# Patient Record
Sex: FEMALE | Race: WHITE
[De-identification: names, ages, dates, MRNs, and addresses within clinical notes are randomized per-mention and may not be internally consistent; named-entity substitution may affect disease eponyms.]

---

## 2017-08-14 ENCOUNTER — HOSPITAL ENCOUNTER (EMERGENCY)
Dept: HOSPITAL 75 - ER | Age: 82
Discharge: HOME | End: 2017-08-14
Payer: MEDICARE

## 2017-08-14 VITALS — SYSTOLIC BLOOD PRESSURE: 107 MMHG | DIASTOLIC BLOOD PRESSURE: 79 MMHG

## 2017-08-14 VITALS — WEIGHT: 99 LBS | BODY MASS INDEX: 17.54 KG/M2 | HEIGHT: 63 IN

## 2017-08-14 DIAGNOSIS — Z87.19: ICD-10-CM

## 2017-08-14 DIAGNOSIS — I10: ICD-10-CM

## 2017-08-14 DIAGNOSIS — Z90.49: ICD-10-CM

## 2017-08-14 DIAGNOSIS — Z90.710: ICD-10-CM

## 2017-08-14 DIAGNOSIS — E11.9: ICD-10-CM

## 2017-08-14 DIAGNOSIS — Z98.51: ICD-10-CM

## 2017-08-14 DIAGNOSIS — K21.9: ICD-10-CM

## 2017-08-14 DIAGNOSIS — K59.00: Primary | ICD-10-CM

## 2017-08-14 LAB
ALBUMIN SERPL-MCNC: 4.3 GM/DL (ref 3.2–4.5)
ALT SERPL-CCNC: 23 U/L (ref 0–55)
AMYLASE SERPL-CCNC: 52 U/L (ref 25–125)
ANION GAP SERPL CALC-SCNC: 11 MMOL/L (ref 5–14)
APTT BLD: 27 SEC (ref 24–35)
AST SERPL-CCNC: 19 U/L (ref 5–34)
BASOPHILS # BLD AUTO: 0.1 10^3/UL (ref 0–0.1)
BASOPHILS NFR BLD AUTO: 1 % (ref 0–10)
BASOPHILS NFR BLD MANUAL: 1 %
BILIRUB SERPL-MCNC: 0.2 MG/DL (ref 0.1–1)
BILIRUB UR QL STRIP: NEGATIVE
BUN SERPL-MCNC: 20 MG/DL (ref 7–18)
BUN/CREAT SERPL: 26
CALCIUM SERPL-MCNC: 9.7 MG/DL (ref 8.5–10.1)
CHLORIDE SERPL-SCNC: 103 MMOL/L (ref 98–107)
CO2 SERPL-SCNC: 24 MMOL/L (ref 21–32)
CREAT SERPL-MCNC: 0.77 MG/DL (ref 0.6–1.3)
EOSINOPHIL # BLD AUTO: 1.2 10^3/UL (ref 0–0.3)
EOSINOPHIL NFR BLD AUTO: 14 % (ref 0–10)
EOSINOPHIL NFR BLD MANUAL: 15 %
ERYTHROCYTE [DISTWIDTH] IN BLOOD BY AUTOMATED COUNT: 14 % (ref 10–14.5)
GFR SERPLBLD BASED ON 1.73 SQ M-ARVRAT: > 60 ML/MIN
GLUCOSE SERPL-MCNC: 148 MG/DL (ref 70–105)
INR PPP: 1.1 (ref 0.8–1.4)
KETONES UR QL STRIP: NEGATIVE
LEUKOCYTE ESTERASE UR QL STRIP: (no result)
LIPASE SERPL-CCNC: 34 U/L (ref 8–78)
LYMPHOCYTES # BLD AUTO: 3.3 X 10^3 (ref 1–4)
LYMPHOCYTES NFR BLD AUTO: 37 % (ref 12–44)
MCH RBC QN AUTO: 30 PG (ref 25–34)
MCHC RBC AUTO-ENTMCNC: 32 G/DL (ref 32–36)
MCV RBC AUTO: 94 FL (ref 80–99)
MONOCYTES # BLD AUTO: 1.1 X 10^3 (ref 0–1)
MONOCYTES NFR BLD AUTO: 12 % (ref 0–12)
NEUTROPHILS # BLD AUTO: 3.3 X 10^3 (ref 1.8–7.8)
NEUTROPHILS NFR BLD AUTO: 37 % (ref 42–75)
NEUTS BAND NFR BLD MANUAL: 32 %
NEUTS BAND NFR BLD: 1 %
NITRITE UR QL STRIP: NEGATIVE
PH UR STRIP: 6.5 [PH] (ref 5–9)
PLATELET # BLD: 230 10^3/UL (ref 130–400)
PMV BLD AUTO: 11.2 FL (ref 7.4–10.4)
POTASSIUM SERPL-SCNC: 4.2 MMOL/L (ref 3.6–5)
PROT SERPL-MCNC: 6.9 GM/DL (ref 6.4–8.2)
PROT UR QL STRIP: NEGATIVE
PROTHROMBIN TIME: 14.2 SEC (ref 12.2–14.7)
RBC # BLD AUTO: 3.78 10^6/UL (ref 4.35–5.85)
SODIUM SERPL-SCNC: 138 MMOL/L (ref 135–145)
SP GR UR STRIP: 1.01 (ref 1.02–1.02)
SQUAMOUS #/AREA URNS HPF: (no result) /HPF
UROBILINOGEN UR-MCNC: NORMAL MG/DL
VARIANT LYMPHS NFR BLD MANUAL: 35 %
VARIANT LYMPHS NFR BLD MANUAL: 4 %
WBC # BLD AUTO: 8.9 10^3/UL (ref 4.3–11)
WBC #/AREA URNS HPF: (no result) /HPF

## 2017-08-14 PROCEDURE — 74177 CT ABD & PELVIS W/CONTRAST: CPT

## 2017-08-14 PROCEDURE — 36415 COLL VENOUS BLD VENIPUNCTURE: CPT

## 2017-08-14 PROCEDURE — 80053 COMPREHEN METABOLIC PANEL: CPT

## 2017-08-14 PROCEDURE — 82150 ASSAY OF AMYLASE: CPT

## 2017-08-14 PROCEDURE — 74022 RADEX COMPL AQT ABD SERIES: CPT

## 2017-08-14 PROCEDURE — 96360 HYDRATION IV INFUSION INIT: CPT

## 2017-08-14 PROCEDURE — 81000 URINALYSIS NONAUTO W/SCOPE: CPT

## 2017-08-14 PROCEDURE — 85610 PROTHROMBIN TIME: CPT

## 2017-08-14 PROCEDURE — 83690 ASSAY OF LIPASE: CPT

## 2017-08-14 PROCEDURE — 85730 THROMBOPLASTIN TIME PARTIAL: CPT

## 2017-08-14 PROCEDURE — 85027 COMPLETE CBC AUTOMATED: CPT

## 2017-08-14 PROCEDURE — 85007 BL SMEAR W/DIFF WBC COUNT: CPT

## 2017-08-14 NOTE — ED ABDOMINAL PAIN
General


Stated Complaint:  ABDOMINAL PAIN


Source of Information:  Patient





History of Present Illness


Time Seen By Provider:  16:50


Initial Comments


PT ARRIVES VIA POV FROM HOME


C/O SUDDEN ONSET OF LEFT MID ABDOMINAL PAIN SOMETIME THIS MORNING OR AFTERNOON


FELT FINE WHEN SHE WOKE UP AND ATE BREAKFAST AND WAS FINE. PT ALSO ATE LUNCH. 


PAIN RADIATES THROUGH TO BACK


NO FEVER


NO NAUSEA/VOMITING/DIARRHEA. HAD NORMAL BM TODAY. PT LATER ADMITS THAT SHE 

ALWAYS HAS TO STRAIN VERY HARD TO HAVE A BM, AND DESCRIBES ONGOING CONSTIPATION


NO URINARY SYMPTOMS


PT HAS HAD PANCREATITIS X 3, PT HAS ALSO  HAD COLITIS


PT HAD COLON RESECTION 05/2017 FOR OBSTRUCTION/TORSION





PCP: DR. NIKI JACKSON, Andreas


[facility]





Allergies and Home Medications


Allergies


Coded Allergies:  


     Penicillins (Verified  Allergy, Unknown, 8/14/17)


     Sulfa (Sulfonamide Antibiotics) (Verified  Allergy, Unknown, 8/14/17)


     latex (Verified  Allergy, Unknown, 8/14/17)


     meperidine (Verified  Allergy, Unknown, 8/14/17)





Review of Systems


Constitutional:  no symptoms reported


Respiratory:  No Symptoms Reported


Gastrointestinal:  See HPI, Abdominal Pain, Denies Constipated, Denies Diarrhea

, Denies Difficulty Swallowing, Denies Nausea, Denies Poor Appetite, Denies 

Poor Fluid Intake, Denies Rectal Bleeding, Denies Vomiting


Genitourinary:  No Symptoms Reported


Musculoskeletal:  back pain


Skin:  no symptoms reported


Psychiatric/Neurological:  No Symptoms Reported


Endocrine:  No Symptoms Reported


Hematologic/Lymphatic:  No Symptoms Reported





Past Medical-Social-Family Hx


Patient Social History


Alcohol Use:  Denies Use


Recreational Drug Use:  No


Smoking Status:  Never a Smoker


Recent Foreign Travel:  No


Contact w/Someone Who Travel:  No





Surgeries


HX Surgeries:  Yes (BOWEL RESECTION 05/2017; HYST/BSO/APY)


Surgeries:  Abdominal, Appendectomy, Bowel Surgery, Hysterectomy, Oophorectomy, 

Tubal Ligation





Respiratory


Hx Respiratory Disorders:  No





Cardiovascular


Hx Cardiac Disorders:  Yes


Cardiac Disorders:  Chronic Edema/Swelling, Hypertension





Neurological


Hx Neurological Disorders:  No





Reproductive System


GYN History:  Hysterectomy





Genitourinary


Hx Genitourinary Disorders:  No





Gastrointestinal


Hx Gastrointestinal Disorders:  Yes


Gastrointestinal Disorders:  Colitis, Gastroesophageal Reflux, Pancreatitis





Musculoskeletal


Hx Musculoskeletal Disorders:  No





Endocrine


Hx Endocrine Disorders:  Yes


Endocrine Disorders:  Diabetes, Non-Insulin dep





HEENT


HX ENT Disorders:  No





Cancer


Hx Cancer:  No





Psychosocial


Hx Psychiatric Problems:  No





Integumentary


HX Skin/Integumentary Disorder:  No





Blood Transfusions


Hx Blood Disorders:  No





Physical Exam


Vital Signs





 VS - Last 72 Hours, by Label








 8/14/17





 16:50


 


Temp 98.0


 


Pulse 84


 


Resp 16


 


B/P (MAP) 193/80


 


Pulse Ox 98





Capillary Refill :


General Appearance:  WD/WN, no apparent distress, thin


HEENT:  PERRL/EOMI, No scleral icterus (R), No scleral icterus (L)


Neck:  normal inspection


Respiratory:  normal breath sounds, no respiratory distress, no accessory 

muscle use


Cardiovascular:  regular rate, rhythm, no murmur


Gastrointestinal:  abnormal bowel sounds (DECREASED), No distended, No guarding

, No rebound, tenderness (MODERATE LEFT MID ABDOMINAL TENDERNESS), No hernia, 

No mass


Extremities:  no calf tenderness, normal capillary refill, pedal edema (1+ 

BILATERALLY)


Back:  no CVA tenderness


Neurologic/Psychiatric:  CNs II-XII nml as tested, no motor/sensory deficits, 

alert, oriented x 3


Skin:  normal color, warm/dry





Progress/Results/Core Measures


Results/Orders


Lab Results





Laboratory Tests








Test


  8/14/17


17:00 8/14/17


17:30 Range/Units


 


 


White Blood Count


  8.9 


  


  4.3-11.0


10^3/uL


 


Red Blood Count


  3.78 L


  


  4.35-5.85


10^6/uL


 


Hemoglobin 11.2 L  11.5-16.0  G/DL


 


Hematocrit 35   35-52  %


 


Mean Corpuscular Volume 94   80-99  FL


 


Mean Corpuscular Hemoglobin 30   25-34  PG


 


Mean Corpuscular Hemoglobin


Concent 32 


  


  32-36  G/DL


 


 


Red Cell Distribution Width 14.0   10.0-14.5  %


 


Platelet Count


  230 


  


  130-400


10^3/uL


 


Mean Platelet Volume 11.2 H  7.4-10.4  FL


 


Neutrophils (%) (Auto) 37 L  42-75  %


 


Lymphocytes (%) (Auto) 37   12-44  %


 


Monocytes (%) (Auto) 12   0-12  %


 


Eosinophils (%) (Auto) 14 H  0-10  %


 


Basophils (%) (Auto) 1   0-10  %


 


Neutrophils # (Auto) 3.3   1.8-7.8  X 10^3


 


Lymphocytes # (Auto) 3.3   1.0-4.0  X 10^3


 


Monocytes # (Auto) 1.1 H  0.0-1.0  X 10^3


 


Eosinophils # (Auto)


  1.2 H


  


  0.0-0.3


10^3/uL


 


Basophils # (Auto)


  0.1 


  


  0.0-0.1


10^3/uL


 


Neutrophils % (Manual) 32    %


 


Lymphocytes % (Manual) 35    %


 


Monocytes % (Manual) 12    %


 


Eosinophils % (Manual) 15    %


 


Basophils % (Manual) 1    %


 


Band Neutrophils 1    %


 


Reactive Lymphocytes 4    %


 


Blood Morphology Comment NORMAL    


 


Prothrombin Time 14.2   12.2-14.7  SEC


 


INR Comment 1.1   0.8-1.4  


 


Activated Partial


Thromboplast Time 27 


  


  24-35  SEC


 


 


Sodium Level 138   135-145  MMOL/L


 


Potassium Level 4.2   3.6-5.0  MMOL/L


 


Chloride Level 103     MMOL/L


 


Carbon Dioxide Level 24   21-32  MMOL/L


 


Anion Gap 11   5-14  MMOL/L


 


Blood Urea Nitrogen 20 H  7-18  MG/DL


 


Creatinine


  0.77 


  


  0.60-1.30


MG/DL


 


Estimat Glomerular Filtration


Rate > 60 


  


   


 


 


BUN/Creatinine Ratio 26    


 


Glucose Level 148 H    MG/DL


 


Calcium Level 9.7   8.5-10.1  MG/DL


 


Total Bilirubin 0.2   0.1-1.0  MG/DL


 


Aspartate Amino Transf


(AST/SGOT) 19 


  


  5-34  U/L


 


 


Alanine Aminotransferase


(ALT/SGPT) 23 


  


  0-55  U/L


 


 


Alkaline Phosphatase 93     U/L


 


Total Protein 6.9   6.4-8.2  GM/DL


 


Albumin 4.3   3.2-4.5  GM/DL


 


Amylase Level 52     U/L


 


Lipase 34   8-78  U/L


 


Urine Color  YELLOW   


 


Urine Clarity  CLEAR   


 


Urine pH  6.5  5-9  


 


Urine Specific Gravity  1.010 L 1.016-1.022  


 


Urine Protein  NEGATIVE  NEGATIVE  


 


Urine Glucose (UA)  NEGATIVE  NEGATIVE  


 


Urine Ketones  NEGATIVE  NEGATIVE  


 


Urine Nitrite  NEGATIVE  NEGATIVE  


 


Urine Bilirubin  NEGATIVE  NEGATIVE  


 


Urine Urobilinogen  NORMAL  NORMAL  MG/DL


 


Urine Leukocyte Esterase  2+ H NEGATIVE  


 


Urine RBC (Auto)  NEGATIVE  NEGATIVE  


 


Urine RBC  NONE   /HPF


 


Urine WBC  0-2   /HPF


 


Urine Squamous Epithelial


Cells 


  0-2 


   /HPF


 


 


Urine Crystals  NONE   /LPF


 


Urine Bacteria  NEGATIVE   /HPF


 


Urine Casts  NONE   /LPF


 


Urine Mucus  NEGATIVE   /LPF


 


Urine Culture Indicated  NO   








My Orders





Orders - MANUEL ELIZALDE DO


Saline Lock/Iv-Start (8/14/17 16:57)


Amylase (8/14/17 16:57)


Cbc With Automated Diff (8/14/17 16:57)


Comprehensive Metabolic Panel (8/14/17 16:57)


Lipase (8/14/17 16:57)


Protime With Inr (8/14/17 16:57)


Partial Thromboplastin Time (8/14/17 16:57)


Ua Culture If Indicated (8/14/17 16:57)


Manual Differential (8/14/17 17:00)


Ct Abdomen/Pelvis W (8/14/17 17:34)


Acute Abd Series (8/14/17 17:34)


Saline Lock/Iv-Start (8/14/17 17:37)


Ns Iv 1000 Ml (Sodium Chloride 0.9%) (8/14/17 17:37)


Iohexol Injection (Omnipaque 350 Mg/Ml 1 (8/14/17 17:45)


Ns (Ivpb) (Sodium Chloride 0.9% Ivpb Bag (8/14/17 17:45)





Medications Given in ED





Current Medications








 Medications  Dose


 Ordered  Sig/Hina


 Route  Start Time


 Stop Time Status Last Admin


Dose Admin


 


 Iohexol  100 ml  ONCE  ONCE


 IV  8/14/17 17:45


 8/14/17 17:52 DC 8/14/17 17:52


100 ML


 


 Sodium Chloride  100 ml  ONCE  ONCE


 IV  8/14/17 17:45


 8/14/17 17:52 DC 8/14/17 17:52


80 ML


 


 Sodium Chloride  1,000 ml @ 


 0 mls/hr  Q0M ONCE


 IV  8/14/17 17:37


 8/14/17 17:38 DC 8/14/17 17:45


1,000 MLS/HR








Vital Signs/I&O





Vital Sign - Last 12Hours








 8/14/17





 16:50


 


Temp 98.0


 


Pulse 84


 


Resp 16


 


B/P (MAP) 193/80


 


Pulse Ox 98








Progress Note :  


Progress Note


PAIN RESOLVED SHORTLY AFTER ARRIVAL


UNEVENTFUL ER STAY





Diagnostic Imaging





Comments


CT ABDOMEN/PELVIS--LARGE AMOUNT OF STOOL THROUGHOUT COLON AND SMALL BOWEL. 

THICKENING OF STOMACH WALL VS UNDERDISTENTION. PER RADIOLOGIST REPORT @ 1834


   Reviewed:  Reviewed by Me





Departure


Impression


Impression:  


 Primary Impression:  


 Constipation


 Additional Impression:  


 POSSIBLE GASTRITIS


Disposition:  01 HOME, SELF-CARE


Condition:  Improved





Departure-Patient Inst.


Referrals:  


NO,LOCAL PHYSICIAN (PCP/Family)


Primary Care Physician


Patient Instructions:  Gastritis (DC), Constipation, Adult (DC)





Add. Discharge Instructions:  


CLEAR LIQUIDS UNTIL YOU HAVE A LARGE BM AND YOUR STOOLS ARE LOOSE--WATER, BROTH

, JELLO, GATORADE


THEN YOU MAY START A HIGH FIBER DIET


INCREASE YOUR DAILY WATER INTAKE





TAKE MIRALAX DAILY--START WITH 1 CAPFUL IN 8 OZ WATER, EVERY 4 HOURS UNTIL YOUR 

STOOLS ARE LOOSE, AND THEN DECREASE TO TWICE A DAY, AND THEN DECREASE TO ONCE A 

DAY IF STOOLS CONTINUE TO BE LOOSE





MAY USE DULCOLAX SUPPOSITORIES AND/ OR FLEET'S ENEMAS FOR BM





FOLLOW UP WITH  THIS WEEK FOR FURTHER CARE


Scripts


Ondansetron (Zofran Odt) 4 Mg Tab.rapdis


4 MG PO Q4H for Nausea/Vomiting, #10 TAB


   Prov: MANUEL ELIZALDE DO         8/14/17 


Pantoprazole Sodium (Protonix) 40 Mg Tablet.dr


40 MG PO DAILY, #15 TAB


   Prov: MANUEL ELIZALDE DO         8/14/17 


Hyoscyamine Sulfate (Levsin-Sl) 0.125 Mg Tab.subl


1-2 TAB SL Q4H for Abdominal Pain, #15 TAB


   Prov: MANUEL ELIZALDE DO         8/14/17 


Dicyclomine HCl (Bentyl) 10 Mg Capsule


10 MG PO Q6H Y for ABDOMINAL PAIN, #15 CAP


   Prov: MANUEL ELIZALDE DO         8/14/17











MANUEL ELIZALDE DO Aug 14, 2017 17:04

## 2017-08-14 NOTE — DIAGNOSTIC IMAGING REPORT
EXAM: ACUTE ABD SERIES



INDICATION: Left lower quadrant abdominal pain.



COMPARISON: CT abdomen and pelvis with IV contrast from earlier

today.



FINDINGS: Normal heart size and pulmonary vascularity. No focal

pulmonary opacity, pleural effusion or pneumothorax. No acute

osseous findings. No free intraperitoneal air. Large amount of

stool throughout the colon and rectum. Nonobstructive bowel gas

pattern.



IMPRESSION: 

Large amount of stool throughout the colon and rectum may

represent a degree of constipation.



Dictated by: 



  Dictated on workstation # [id number]

## 2017-08-14 NOTE — DIAGNOSTIC IMAGING REPORT
PROCEDURE: CT abdomen and pelvis with contrast.



TECHNIQUE: Multiple contiguous axial images were obtained through

the abdomen and pelvis after administration of intravenous

contrast. 



INDICATION:  Left lower quadrant abdominal pain. 



COMPARISON: None.



FINDINGS: Lung bases are clear. The liver, gallbladder, pancreas,

spleen, adrenals, kidneys and collecting systems are

unremarkable. Appendectomy. Postoperative changes in the

ascending colon. Hysterectomy. There is a large amount of stool

throughout the colon and rectum. There may be diffuse

circumferential wall thickening of the stomach, which is

collapsed, limiting evaluation. Scattered arterial calcifications

including a normal caliber abdominal aorta. No free

intraperitoneal air or fluid. No lymphadenopathy. No evidence of

bowel obstruction. Moderate degenerative changes in the

visualized spine. No acute osseous findings.



IMPRESSION: 

1. Large amount of stool throughout the colon and rectum may

represent fecal impaction with constipation.

2. There may be diffuse circumferential wall thickening of the

stomach. However, the stomach is collapsed, limiting evaluation.

This could be better evaluated with endoscopy.

3. Chronic and incidental findings as above.



Dictated by: 



  Dictated on workstation # [id number]

## 2018-05-08 ENCOUNTER — HOSPITAL ENCOUNTER (EMERGENCY)
Dept: HOSPITAL 75 - ER | Age: 83
Discharge: HOME | End: 2018-05-08
Payer: MEDICARE

## 2018-05-08 VITALS — DIASTOLIC BLOOD PRESSURE: 68 MMHG | SYSTOLIC BLOOD PRESSURE: 171 MMHG

## 2018-05-08 VITALS — HEIGHT: 62 IN | WEIGHT: 102 LBS | BODY MASS INDEX: 18.77 KG/M2

## 2018-05-08 DIAGNOSIS — Z98.51: ICD-10-CM

## 2018-05-08 DIAGNOSIS — Z91.040: ICD-10-CM

## 2018-05-08 DIAGNOSIS — R60.0: ICD-10-CM

## 2018-05-08 DIAGNOSIS — Z90.722: ICD-10-CM

## 2018-05-08 DIAGNOSIS — Z88.6: ICD-10-CM

## 2018-05-08 DIAGNOSIS — Z88.2: ICD-10-CM

## 2018-05-08 DIAGNOSIS — I10: ICD-10-CM

## 2018-05-08 DIAGNOSIS — K21.9: ICD-10-CM

## 2018-05-08 DIAGNOSIS — Z88.0: ICD-10-CM

## 2018-05-08 DIAGNOSIS — Z90.49: ICD-10-CM

## 2018-05-08 DIAGNOSIS — B37.0: ICD-10-CM

## 2018-05-08 DIAGNOSIS — E11.9: ICD-10-CM

## 2018-05-08 DIAGNOSIS — Z87.19: ICD-10-CM

## 2018-05-08 DIAGNOSIS — J06.9: Primary | ICD-10-CM

## 2018-05-08 DIAGNOSIS — Z90.710: ICD-10-CM

## 2018-05-08 PROCEDURE — 99282 EMERGENCY DEPT VISIT SF MDM: CPT

## 2018-05-08 NOTE — ED COUGH/URI
General


Chief Complaint:  Cough/Cold/Flu Symptoms


Stated Complaint:  CHEST CONGESTION


Nursing Triage Note:  


pt states she has chest congestion and has been coughing up yellow mucous since 


yesterday.  c/o chest pain when coughing.  describes mucous as a copious 

amount. 


 pt c/o dizziness.  also states she has fallen several times in the last couple 


of months but nothing recently


Source:  patient


Exam Limitations:  no limitations





History of Present Illness


Date Seen by Provider:  May 8, 2018


Time Seen by Provider:  11:24


Initial Comments


A 4-year-old female patient presents to the emergency department complaints of 

chest congestion and coughing up yellow mucus since yesterday.  Also reports 

rib pain with coughing.  Does complain of dizziness with coughing spells.  

Patient does have a history of dizziness with frequent falls.  Has not fallen 

for approximately 2 months.   She sees Dr. Myers for chronic medical 

conditions.  She was treated 2-3 wks ago for colitis, but states symptoms have 

resolved.


Timing/Duration:  constant, yesterday


Severity/Quality:  productive cough (yellow productive cough)


Prior Episodes/Possible Cause:  no prior episodes


Modifying Factors:  Worse With Coughing





Allergies and Home Medications


Allergies


Coded Allergies:  


     Penicillins (Verified  Allergy, Unknown, 8/14/17)


     Sulfa (Sulfonamide Antibiotics) (Verified  Allergy, Unknown, 8/14/17)


     latex (Verified  Allergy, Unknown, 8/14/17)


     meperidine (Verified  Allergy, Unknown, 8/14/17)





Home Medications


Dicyclomine HCl 10 Mg Capsule, 10 MG PO Q6H PRN for ABDOMINAL PAIN


   Prescribed by: MANUEL ELIZALDE on 8/14/17 1846


Hyoscyamine Sulfate 0.125 Mg Tab.subl, 1-2 TAB SL Q4H


   Prescribed by: MANUEL ELIZALDE on 8/14/17 1846


Ondansetron 4 Mg Tab.rapdis, 4 MG PO Q4H


   Prescribed by: MANUEL ELIZALDE on 8/14/17 1846


Pantoprazole Sodium 40 Mg Tablet.dr, 40 MG PO DAILY


   Prescribed by: MANUEL ELIZALDE on 8/14/17 1846





Patient Home Medication List


Home Medication List Reviewed:  Yes





Review of Systems


Constitutional:  see HPI; No chills; dizziness (with coughing); No fever; 

malaise


EENTM:  nose congestion; No ear pain, No hoarseness, No throat pain, No throat 

swelling


Respiratory:  see HPI, cough; No dyspnea on exertion; phlegm; No short of breath

, No wheezing; other (rib pain with coughing.)


Cardiovascular:  No chest pain, No palpitations, No syncope


Gastrointestinal:  no symptoms reported


Genitourinary:  no symptoms reported


Musculoskeletal:  no symptoms reported


Skin:  no symptoms reported


Psychiatric/Neurological:  No Symptoms Reported


All Other Systems Reviewed


Negative Unless Noted:  Yes (Negative excepted noted.)





Past Medical-Social-Family Hx


Patient Social History


Alcohol Use:  Denies Use


Recreational Drug Use:  No


Smoking Status:  Never a Smoker


Recent Foreign Travel:  No


Contact w/Someone Who Travel:  No


Recent Infectious Disease Expo:  No


Recent Hopitalizations:  No


Physical Abuse:  No


Sexual Abuse:  No





Past Medical History


Surgeries:  Yes (BOWEL RESECTION 05/2017; HYST/BSO/APY)


Abdominal, Appendectomy, Bowel Surgery, Hysterectomy, Oophorectomy, Tubal 

Ligation


Respiratory:  No


Cardiac:  Yes


Chronic Edema/Swelling, Hypertension


Neurological:  No


GYN History:  Hysterectomy


Genitourinary:  No


Gastrointestinal:  Yes


Colitis, Gastroesophageal Reflux, Pancreatitis


Musculoskeletal:  No


Endocrine:  Yes


Diabetes, Non-Insulin dep


Cancer:  No


Psychosocial:  No


Nursing Suicide Risk Score:  0


Integumentary:  No


Blood Disorders:  No





Family Medical History


Reviewed Nursing Family Hx


No Pertinent Family Hx





Physical Exam


Vital Signs





Vital Signs - First Documented








 5/8/18





 09:32


 


Temp 97.6


 


Pulse 73


 


Resp 16


 


B/P (MAP) 198/77 (117)


 


Pulse Ox 99


 


O2 Delivery Room Air





Capillary Refill : Less Than 3 Seconds


General Appearance:  WD/WN, no apparent distress


HEENT:  PERRL/EOMI, TMs normal, pharyngeal erythema, other ((+) nasal 

congestion without sinus tenderness. white, fuzzy coating of the tongue.)


Neck:  non-tender, supple, normal inspection


Respiratory:  lungs clear, normal breath sounds, no respiratory distress, no 

accessory muscle use


Cardiovascular:  normal peripheral pulses, regular rate, rhythm, no edema, no 

murmur


Gastrointestinal:  normal bowel sounds, non tender, soft


Extremities:  no pedal edema, normal capillary refill


Neurologic/Psychiatric:  alert, normal mood/affect, oriented x 3


Skin:  normal color, warm/dry





Progress/Results/Core Measures


Suspected Sepsis


Recent Fever Within 48 Hours:  No


Infection Criteria Present:  None


New/Unexplained  Altered Menta:  No


Sepsis Screen:  No Definite Risk


SIRS


Temperature:97.6 


Pulse: 73 


Respiratory Rate: 16


 


Blood Pressure 198 /77 


Mean: 117





Results/Orders


Vital Signs/I&O











 5/8/18





 09:32


 


Temp 97.6


 


Pulse 73


 


Resp 16


 


B/P (MAP) 198/77 (117)


 


Pulse Ox 99


 


O2 Delivery Room Air





Capillary Refill : Less Than 3 Seconds








Blood Pressure Mean:  117











Departure


Communication (Admissions)


Patient seen and evaluated.  Plan for discharge to home.





Impression





 Primary Impression:  


 Upper respiratory infection


 Qualified Codes:  J06.9 - Acute upper respiratory infection, unspecified


 Additional Impression:  


 Thrush, oral


Disposition:  01 HOME, SELF-CARE


Condition:  Improved





Departure-Patient Inst.


Decision time for Depature:  11:36


Referrals:  


LALO MYERS DO (PCP/Family)


Primary Care Physician


Patient Instructions:  Bacterial Upper Respiratory Infection, Adult (DC)





Add. Discharge Instructions:  


All discharge instructions reviewed with patient and/or family. Voiced 

understanding.  Medications as instructed.  Tylenol extra strength over-the-

counter as directed for pain if needed.  Ibuprofen 400-600 mg by mouth every 6-

8 hours as needed for pain.  Drink plenty of fluids.  Cool humidifier.  You may 

use saline nasal spray and Afrin nasal spray over-the-counter as needed for 

nasal congestion.  Mucinex over-the-counter for chest congestion.  Follow-up 

with Dr. Myers for recheck as an outpatient if no improvement in symptoms.  

Return to the emergency department for worsened symptoms or any other concerns.


Scripts


Benzonatate (Benzonatate) 200 Mg Capsule


200 MG PO Q8H PRN for COUGH, #30 CAP 0 Refills


   Prov: JAYCE FOWLER         5/8/18 


Azithromycin (Zithromax) 250 Mg Tablet


250 MG PO UD, #6 TAB 0 Refills


   TAKE 2 TABLETS TODAY, THEN TAKE 1 TABLET DAILY FOR 4 MORE DAYS


   Prov: JAYCE FOWLER         5/8/18 


Fluconazole (Diflucan) 100 Mg Tablet


100 MG PO DAILY for 7 Days, #7 TAB 0 Refills


   Prov: JAYCE FOWLER         5/8/18











JAYCE FOWLER May 8, 2018 11:24

## 2018-07-22 ENCOUNTER — HOSPITAL ENCOUNTER (EMERGENCY)
Dept: HOSPITAL 75 - ER | Age: 83
Discharge: HOME | End: 2018-07-22
Payer: MEDICARE

## 2018-07-22 VITALS — DIASTOLIC BLOOD PRESSURE: 66 MMHG | SYSTOLIC BLOOD PRESSURE: 143 MMHG

## 2018-07-22 VITALS — BODY MASS INDEX: 19.32 KG/M2 | WEIGHT: 105 LBS | HEIGHT: 62 IN

## 2018-07-22 DIAGNOSIS — Z90.49: ICD-10-CM

## 2018-07-22 DIAGNOSIS — Z87.19: ICD-10-CM

## 2018-07-22 DIAGNOSIS — K21.9: ICD-10-CM

## 2018-07-22 DIAGNOSIS — Z88.8: ICD-10-CM

## 2018-07-22 DIAGNOSIS — Z88.2: ICD-10-CM

## 2018-07-22 DIAGNOSIS — Z88.0: ICD-10-CM

## 2018-07-22 DIAGNOSIS — Z98.51: ICD-10-CM

## 2018-07-22 DIAGNOSIS — Z91.040: ICD-10-CM

## 2018-07-22 DIAGNOSIS — R60.0: Primary | ICD-10-CM

## 2018-07-22 DIAGNOSIS — Z90.710: ICD-10-CM

## 2018-07-22 DIAGNOSIS — E11.9: ICD-10-CM

## 2018-07-22 LAB
ALBUMIN SERPL-MCNC: 4.8 GM/DL (ref 3.2–4.5)
ALP SERPL-CCNC: 67 U/L (ref 40–136)
ALT SERPL-CCNC: 29 U/L (ref 0–55)
APTT BLD: 25 SEC (ref 24–35)
BASOPHILS # BLD AUTO: 0.1 10^3/UL (ref 0–0.1)
BASOPHILS NFR BLD AUTO: 1 % (ref 0–10)
BILIRUB SERPL-MCNC: 0.3 MG/DL (ref 0.1–1)
BUN/CREAT SERPL: 21
CALCIUM SERPL-MCNC: 9.4 MG/DL (ref 8.5–10.1)
CHLORIDE SERPL-SCNC: 103 MMOL/L (ref 98–107)
CK MB SERPL-MCNC: 2.9 NG/ML (ref ?–6.6)
CK SERPL-CCNC: 83 U/L (ref 29–168)
CO2 SERPL-SCNC: 20 MMOL/L (ref 21–32)
CREAT SERPL-MCNC: 0.8 MG/DL (ref 0.6–1.3)
D DIMER PPP FEU-MCNC: 0.3 UG/ML (ref 0–0.49)
EOSINOPHIL # BLD AUTO: 0 10^3/UL (ref 0–0.3)
EOSINOPHIL NFR BLD AUTO: 0 % (ref 0–10)
ERYTHROCYTE [DISTWIDTH] IN BLOOD BY AUTOMATED COUNT: 16.7 % (ref 10–14.5)
GFR SERPLBLD BASED ON 1.73 SQ M-ARVRAT: > 60 ML/MIN
GLUCOSE SERPL-MCNC: 175 MG/DL (ref 70–105)
HCT VFR BLD CALC: 28 % (ref 35–52)
HGB BLD-MCNC: 8.6 G/DL (ref 11.5–16)
INR PPP: 1.1 (ref 0.8–1.4)
LYMPHOCYTES # BLD AUTO: 2.4 X 10^3 (ref 1–4)
LYMPHOCYTES NFR BLD AUTO: 20 % (ref 12–44)
MAGNESIUM SERPL-MCNC: 1.9 MG/DL (ref 1.8–2.4)
MANUAL DIFFERENTIAL PERFORMED BLD QL: NO
MCH RBC QN AUTO: 23 PG (ref 25–34)
MCHC RBC AUTO-ENTMCNC: 31 G/DL (ref 32–36)
MCV RBC AUTO: 75 FL (ref 80–99)
MONOCYTES # BLD AUTO: 1.3 X 10^3 (ref 0–1)
MONOCYTES NFR BLD AUTO: 10 % (ref 0–12)
MYOGLOBIN SERPL-MCNC: 44.3 NG/ML (ref 10–92)
NEUTROPHILS # BLD AUTO: 8.6 X 10^3 (ref 1.8–7.8)
NEUTROPHILS NFR BLD AUTO: 69 % (ref 42–75)
PLATELET # BLD: 299 10^3/UL (ref 130–400)
PMV BLD AUTO: 11 FL (ref 7.4–10.4)
POTASSIUM SERPL-SCNC: 4.2 MMOL/L (ref 3.6–5)
PROT SERPL-MCNC: 7.4 GM/DL (ref 6.4–8.2)
PROTHROMBIN TIME: 14 SEC (ref 12.2–14.7)
RBC # BLD AUTO: 3.67 10^6/UL (ref 4.35–5.85)
SODIUM SERPL-SCNC: 136 MMOL/L (ref 135–145)
TSH SERPL DL<=0.05 MIU/L-ACNC: 2.08 UIU/ML (ref 0.35–4.94)
WBC # BLD AUTO: 12.4 10^3/UL (ref 4.3–11)

## 2018-07-22 PROCEDURE — 71045 X-RAY EXAM CHEST 1 VIEW: CPT

## 2018-07-22 PROCEDURE — 82550 ASSAY OF CK (CPK): CPT

## 2018-07-22 PROCEDURE — 93041 RHYTHM ECG TRACING: CPT

## 2018-07-22 PROCEDURE — 85379 FIBRIN DEGRADATION QUANT: CPT

## 2018-07-22 PROCEDURE — 85730 THROMBOPLASTIN TIME PARTIAL: CPT

## 2018-07-22 PROCEDURE — 36415 COLL VENOUS BLD VENIPUNCTURE: CPT

## 2018-07-22 PROCEDURE — 93005 ELECTROCARDIOGRAM TRACING: CPT

## 2018-07-22 PROCEDURE — 84443 ASSAY THYROID STIM HORMONE: CPT

## 2018-07-22 PROCEDURE — 82553 CREATINE MB FRACTION: CPT

## 2018-07-22 PROCEDURE — 84484 ASSAY OF TROPONIN QUANT: CPT

## 2018-07-22 PROCEDURE — 83880 ASSAY OF NATRIURETIC PEPTIDE: CPT

## 2018-07-22 PROCEDURE — 85610 PROTHROMBIN TIME: CPT

## 2018-07-22 PROCEDURE — 83735 ASSAY OF MAGNESIUM: CPT

## 2018-07-22 PROCEDURE — 85025 COMPLETE CBC W/AUTO DIFF WBC: CPT

## 2018-07-22 PROCEDURE — 80053 COMPREHEN METABOLIC PANEL: CPT

## 2018-07-22 PROCEDURE — 83874 ASSAY OF MYOGLOBIN: CPT

## 2018-07-22 NOTE — ED GENERAL
General


Chief Complaint:  General Problems/Pain


Stated Complaint:  FEET SWELLING SENT FROM URGENT CARE


Nursing Triage Note:  


PT WAS SENT TO ER BY [facility] WITH COMPLAINT OF BILAT ANKLE/LEG 

SWELLING. 


PT STATES SHE HAS HAD THIS FOR SIX WEEKS AND HAS SEEN HER PCP. PT STATES SHE 


ALSO FEELS SOB AFTER EXEERTION.


Nursing Sepsis Screen:  No Definite Risk





History of Present Illness


Date Seen by Provider:  2018


Time Seen by Provider:  17:00


Initial Comments


Patient is an 84-year-old female who presents to the emergency room with 

complaints of bilateral lower extremity swelling and mild shortness of breath 

with exertion for the past 6 weeks. Denies chest pain, nausea, vomiting, 

abdominal pain.


Timing/Duration:  Other (6 weeks)


Modifying Factors:  improves with Movement


Associated Systoms:  No Chest Pain, No Fever/Chills, No Headaches, No Loss of 

Appetite, No Nausea/Vomiting, No Seizure; Shortness of Air; No Syncope, No 

Weakness





Allergies and Home Medications


Allergies


Coded Allergies:  


     Penicillins (Verified  Allergy, Unknown, 17)


     Sulfa (Sulfonamide Antibiotics) (Verified  Allergy, Unknown, 17)


     latex (Verified  Allergy, Unknown, 17)


     meperidine (Verified  Allergy, Unknown, 17)





Home Medications


Furosemide 20 Mg Tablet, 20 MG PO DAILY


   Prescribed by: RASHMI PORRAS on 18





Patient Home Medication List


Home Medication List Reviewed:  Yes





Review of Systems


Constitutional:  see HPI; No chills, No diaphoresis, No dizziness, No fever


EENTM:  see HPI; No blurred vision, No double vision, No eye pain, No tearing


Respiratory:  see HPI; No cough; dyspnea on exertion; No hemoptysis, No 

orthopnea, No phlegm; short of breath; No stridor, No wheezing


Cardiovascular:  see HPI; No chest pain; edema (bilateral lower extremity edema)

; No Hx of Intervention, No palpitations, No syncope, No vascular heart diseas


Gastrointestinal:  see HPI; No abdominal pain, No constipation, No diarrhea, No 

dysphagia


Genitourinary:  see HPI; No decreased output, No discharge, No dysuria, No 

frequency


Musculoskeletal:  see HPI; No back pain, No gout, No joint pain, No joint 

swelling, No muscle pain


Skin:  see HPI; No change in color, No change in hair/nails, No dryness


Psychiatric/Neurological:  See HPI; Denies Anxiety, Denies Depressed, Denies 

Headache


Hematologic/Lymphatic:  See HPI; Denies Anemia, Denies Blood Clots


Immunological/Allergic:  see HPI; denies food allergy, denies grass allergy


All Other Systems Reviewed


Negative Unless Noted:  Yes





Past Medical-Social-Family Hx


Past Med/Social Hx:  Reviewed Nursing Past Med/Soc Hx


Patient Social History


Alcohol Use:  Denies Use


Recreational Drug Use:  No


Smoking Status:  Never a Smoker


Recent Foreign Travel:  No


Contact w/Someone Who Travel:  No


Recent Infectious Disease Expo:  No


Recent Hopitalizations:  No





Immunizations Up To Date


Tetanus Booster (TDap):  Unknown


PED Vaccines UTD:  Yes





Past Medical History


Surgeries:  Yes (BOWEL RESECTION 2017; HYST/BSO/APY)


Abdominal, Appendectomy, Bowel Surgery, Hysterectomy, Oophorectomy, Tubal 

Ligation


Respiratory:  No


Cardiac:  Yes


Chronic Edema/Swelling, Hypertension


Neurological:  No


GYN History:  Hysterectomy


Genitourinary:  No


Gastrointestinal:  Yes


Colitis, Gastroesophageal Reflux, Pancreatitis


Musculoskeletal:  No


Endocrine:  Yes


Diabetes, Non-Insulin dep


Cancer:  No


Psychosocial:  No


Integumentary:  No


Blood Disorders:  No





Family Medical History


Reviewed Nursing Family Hx


No Pertinent Family Hx





Physical Exam


Vital Signs





Vital Signs - First Documented








 18





 15:59


 


Temp 97.0


 


Pulse 75


 


Resp 17


 


B/P (MAP) 153/59 (90)


 


Pulse Ox 100


 


O2 Delivery Room Air





Capillary Refill : Less Than 3 Seconds


Height, Weight, BMI


Height: 5'2.00"


Weight: 105lbs. oz. 47.029595ak;  BMI


Method:Stated


General Appearance:  No Apparent Distress, WD/WN


Eyes:  Bilateral Eye Normal Inspection, Bilateral Eye PERRL, Bilateral Eye EOMI


HEENT:  PERRL/EOMI, TMs Normal, Normal ENT Inspection, Pharynx Normal


Neck:  Full Range of Motion, Normal Inspection, Non Tender, Supple


Respiratory:  Chest Non Tender, Lungs Clear, Normal Breath Sounds, No Accessory 

Muscle Use, No Respiratory Distress


Cardiovascular:  Regular Rate, Rhythm, No Gallop, No JVD, No Murmur, Normal 

Peripheral Pulses, Other (bilateral lower extremity pitting edema that extends 

up to her knees.)


Gastrointestinal:  Normal Bowel Sounds, No Organomegaly, No Pulsatile Mass, Non 

Tender, Soft


Back:  Normal Inspection, No CVA Tenderness, No Vertebral Tenderness


Extremity:  Normal Capillary Refill, Normal Inspection, Normal Range of Motion, 

Non Tender, No Calf Tenderness


Neurologic/Psychiatric:  Alert, Oriented x3, Normal Mood/Affect


Skin:  Normal Color, Warm/Dry


Lymphatic:  No Adenopathy





Progress/Results/Core Measures


Suspected Sepsis


Recent Fever Within 48 Hours:  No


Infection Criteria Present:  None


New/Unexplained  Altered Menta:  No


Sepsis Screen:  No Definite Risk


SIRS


Temperature:97.0 


Pulse: 75 


Respiratory Rate: 17


 


Laboratory Tests


18 16:23: White Blood Count 12.4H


Blood Pressure 153 /59 


Mean: 90


 


Laboratory Tests


18 16:23: 


Creatinine 0.80, INR Comment 1.1, Platelet Count 299, Total Bilirubin 0.3








Results/Orders


Lab Results





Laboratory Tests








Test


 18


16:23 Range/Units


 


 


White Blood Count


 12.4 H


 4.3-11.0


10^3/uL


 


Red Blood Count


 3.67 L


 4.35-5.85


10^6/uL


 


Hemoglobin 8.6 L 11.5-16.0  G/DL


 


Hematocrit 28 L 35-52  %


 


Mean Corpuscular Volume 75 L 80-99  FL


 


Mean Corpuscular Hemoglobin 23 L 25-34  PG


 


Mean Corpuscular Hemoglobin


Concent 31 L


 32-36  G/DL





 


Red Cell Distribution Width 16.7 H 10.0-14.5  %


 


Platelet Count


 299 


 130-400


10^3/uL


 


Mean Platelet Volume 11.0 H 7.4-10.4  FL


 


Neutrophils (%) (Auto) 69  42-75  %


 


Lymphocytes (%) (Auto) 20  12-44  %


 


Monocytes (%) (Auto) 10  0-12  %


 


Eosinophils (%) (Auto) 0  0-10  %


 


Basophils (%) (Auto) 1  0-10  %


 


Neutrophils # (Auto) 8.6 H 1.8-7.8  X 10^3


 


Lymphocytes # (Auto) 2.4  1.0-4.0  X 10^3


 


Monocytes # (Auto) 1.3 H 0.0-1.0  X 10^3


 


Eosinophils # (Auto)


 0.0 


 0.0-0.3


10^3/uL


 


Basophils # (Auto)


 0.1 


 0.0-0.1


10^3/uL


 


Prothrombin Time 14.0  12.2-14.7  SEC


 


INR Comment 1.1  0.8-1.4  


 


Activated Partial


Thromboplast Time 25 


 24-35  SEC





 


D-Dimer


 0.30 


 0.00-0.49


UG/ML


 


Sodium Level 136  135-145  MMOL/L


 


Potassium Level 4.2  3.6-5.0  MMOL/L


 


Chloride Level 103    MMOL/L


 


Carbon Dioxide Level 20 L 21-32  MMOL/L


 


Anion Gap 13  5-14  MMOL/L


 


Blood Urea Nitrogen 17  7-18  MG/DL


 


Creatinine


 0.80 


 0.60-1.30


MG/DL


 


Estimat Glomerular Filtration


Rate > 60 


  





 


BUN/Creatinine Ratio 21   


 


Glucose Level 175 H   MG/DL


 


Calcium Level 9.4  8.5-10.1  MG/DL


 


Magnesium Level 1.9  1.8-2.4  MG/DL


 


Total Bilirubin 0.3  0.1-1.0  MG/DL


 


Aspartate Amino Transf


(AST/SGOT) 20 


 5-34  U/L





 


Alanine Aminotransferase


(ALT/SGPT) 29 


 0-55  U/L





 


Alkaline Phosphatase 67    U/L


 


Total Creatine Kinase 83    U/L


 


Creatine Kinase MB 2.9  <6.6  NG/ML


 


Myoglobin


 44.3 


 10.0-92.0


NG/ML


 


Troponin I < 0.30  <0.30  NG/ML


 


B-Type Natriuretic Peptide 80.5  <100.0  PG/ML


 


Total Protein 7.4  6.4-8.2  GM/DL


 


Albumin 4.8 H 3.2-4.5  GM/DL


 


TSH Cascade Testing


 2.08 


 0.35-4.94


UIU/ML








Vital Signs/I&O


Capillary Refill : Less Than 3 Seconds








Blood Pressure Mean:  90








Progress Note :  


   Time:  17:00


Progress Note


I have assumed care from Jayce LAWSON at this time. Patient reports that 

she is currently being treated for shingles by Dr. Berkowitz. She also reports 

a Dr. Berkowitz has been watching her hemoglobin levels and she's been low for 

a while. She states that her current hemoglobin level is normal for her.





ECG


EKG :  


   EKG Time:  16:17


   Rate:  65


   ECG Comparisson:  No Previous ECG Available


Comment


2:1 AV Block





Diagnostic Imaging





   Diagonstic Imaging:  Xray


   Plain Films/CT/US/NM/MRI:  chest


Comments


 VIA [facility].


 [place], [place]





NAME:   TERESSA SINGH


MED REC#:   [id number]


ACCOUNT#:   [id number]


PT STATUS:   REG ER


:   1934


PHYSICIAN:   JAYCE FOWLER


ADMIT DATE:   18/ER


 ***Draft***


Date of Exam:18





CHEST 1 VIEW, AP/PA ONLY








INDICATION: Lower extremity swelling. 





COMPARISON: 2017. 





EXAMINATION: Single view of the chest was obtained.





FINDINGS: Clear lungs, bilaterally. The heart is normal. There is


no pneumothorax. The osseous structures are normal. 





IMPRESSION: Negative chest. 





  Dictated on workstation # [id number]








Dict:   18 1715


Trans:   18 1747


[facility] [phone]





Interpreted by:     BLANCA PRUITT


Electronically signed by:





Departure


Impression





 Primary Impression:  


 Edema of both feet


Disposition:  01 HOME, SELF-CARE


Condition:  Stable/Unchanged





Departure-Patient Inst.


Decision time for Depature:  18:07


Referrals:  


NARCISO BERKOWITZ DO (PCP/Family)


Primary Care Physician


Patient Instructions:  Dependent Edema (DC)





Add. Discharge Instructions:  


Take medications as directed. Continue all home medications as previously 

prescribed. Try to elevate here feet while at rest and is much as possible and 

wear the compression stockings. Follow up with Dr. Berkowitz within 1 week for 

recheck call first thing tomorrow morning for appointment time as I believe 

that the causing of your swelling is due to your medication nifedipine and 

could need changing. All discharge instructions reviewed with patient and/or 

family. Voiced understanding.


Scripts


Furosemide (Lasix) 20 Mg Tablet


20 MG PO DAILY for 3 Days, #3 TAB


   Prov: RASHMI PORRAS         18











RASHMI PORRAS 2018 17:21

## 2018-08-18 ENCOUNTER — HOSPITAL ENCOUNTER (EMERGENCY)
Dept: HOSPITAL 75 - ER | Age: 83
Discharge: HOME | End: 2018-08-18
Payer: MEDICARE

## 2018-08-18 VITALS — DIASTOLIC BLOOD PRESSURE: 66 MMHG | SYSTOLIC BLOOD PRESSURE: 158 MMHG

## 2018-08-18 VITALS — HEIGHT: 62 IN | BODY MASS INDEX: 18.77 KG/M2 | WEIGHT: 102 LBS

## 2018-08-18 DIAGNOSIS — L03.115: ICD-10-CM

## 2018-08-18 DIAGNOSIS — I10: ICD-10-CM

## 2018-08-18 DIAGNOSIS — Z88.0: ICD-10-CM

## 2018-08-18 DIAGNOSIS — Z88.8: ICD-10-CM

## 2018-08-18 DIAGNOSIS — I48.91: ICD-10-CM

## 2018-08-18 DIAGNOSIS — Z91.040: ICD-10-CM

## 2018-08-18 DIAGNOSIS — S80.11XA: Primary | ICD-10-CM

## 2018-08-18 DIAGNOSIS — Z86.73: ICD-10-CM
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
## 2018-08-18 DIAGNOSIS — Z79.01: ICD-10-CM

## 2018-08-18 DIAGNOSIS — Z79.84: ICD-10-CM

## 2018-08-18 DIAGNOSIS — E11.42: ICD-10-CM

## 2018-08-18 DIAGNOSIS — Z88.2: ICD-10-CM

## 2018-08-18 DIAGNOSIS — Z90.89: ICD-10-CM

## 2018-08-18 DIAGNOSIS — Z98.51: ICD-10-CM

## 2018-08-18 DIAGNOSIS — Z90.710: ICD-10-CM

## 2018-08-18 DIAGNOSIS — E78.00: ICD-10-CM

## 2018-08-18 LAB
BASOPHILS # BLD AUTO: 0 10^3/UL (ref 0–0.1)
BASOPHILS NFR BLD AUTO: 0 % (ref 0–10)
EOSINOPHIL # BLD AUTO: 0.1 10^3/UL (ref 0–0.3)
EOSINOPHIL NFR BLD AUTO: 1 % (ref 0–10)
ERYTHROCYTE [DISTWIDTH] IN BLOOD BY AUTOMATED COUNT: 29.4 % (ref 10–14.5)
ERYTHROCYTE [SEDIMENTATION RATE] IN BLOOD: 29 MM/HR (ref 0–30)
HCT VFR BLD CALC: 29 % (ref 35–52)
HGB BLD-MCNC: 8.8 G/DL (ref 11.5–16)
LYMPHOCYTES # BLD AUTO: 1.7 X 10^3 (ref 1–4)
LYMPHOCYTES NFR BLD AUTO: 24 % (ref 12–44)
MANUAL DIFFERENTIAL PERFORMED BLD QL: NO
MCH RBC QN AUTO: 26 PG (ref 25–34)
MCHC RBC AUTO-ENTMCNC: 30 G/DL (ref 32–36)
MCV RBC AUTO: 84 FL (ref 80–99)
MONOCYTES # BLD AUTO: 0.8 X 10^3 (ref 0–1)
MONOCYTES NFR BLD AUTO: 12 % (ref 0–12)
NEUTROPHILS # BLD AUTO: 4.6 X 10^3 (ref 1.8–7.8)
NEUTROPHILS NFR BLD AUTO: 63 % (ref 42–75)
PLATELET # BLD: 219 10^3/UL (ref 130–400)
PMV BLD AUTO: 11 FL (ref 7.4–10.4)
RBC # BLD AUTO: 3.45 10^6/UL (ref 4.35–5.85)
WBC # BLD AUTO: 7.3 10^3/UL (ref 4.3–11)

## 2018-08-18 PROCEDURE — 85652 RBC SED RATE AUTOMATED: CPT

## 2018-08-18 PROCEDURE — 36415 COLL VENOUS BLD VENIPUNCTURE: CPT

## 2018-08-18 PROCEDURE — 85025 COMPLETE CBC W/AUTO DIFF WBC: CPT

## 2018-08-18 NOTE — DIAGNOSTIC IMAGING REPORT
PROCEDURE: US right lower extremity venous.



TECHNIQUE: Multiple real-time grayscale images were obtained over

the right lower extremity in various projections. Additional

duplex Doppler and color Doppler images were also obtained.



INDICATION: Right leg pain. Recent fall.



COMPARISON:  None



FINDINGS: 



The right common femoral vein, femoral vein, deep femoral vein,

and popliteal vein are normal in appearance.  These vessels show

normal compressibility, color flow and doppler augmentation.  The

visualized deep calf veins demonstrate no distinct intraluminal

thrombus. 



IMPRESSION: 

1. No sonographic evidence of deep venous thrombosis in the right

lower extremity.





Dictated by: 



  Dictated on workstation # [id number]

## 2018-08-18 NOTE — ED LOWER EXTREMITY
General


Chief Complaint:  Lower Extremity


Stated Complaint:  FELL AT HOME A WEEK AGO/RT LEG/HIP HURTING


Nursing Triage Note:  


pt presents to ed with complaints of fall last week that caused pain to r hip 


and leg. pt reports she also a few days ago cut her r calf on the shower door. 


pt reports she was seen by dr berkowitz a couple days ago and put on doxy and 


clinda. pt is concerened about the redness and bruising and wants it to be 


checked out.


Nursing Sepsis Screen:  No Definite Risk


Source:  patient


Exam Limitations:  no limitations





History of Present Illness


Date Seen by Provider:  Aug 18, 2018


Time Seen by Provider:  07:54


Initial Comments


This 84-year-old white female presents with bruising and pain of her right leg.

  The patient fell bruising her right hip 1 week ago and subsequently(a couple 

days ago) scraped the right leg between her knee and ankle getting out of the 

shower.  The patient has had migrating bruises from her right hip to her ankle.

  The patient has had redness and swelling over the area that she scraped which 

is in the midportion of the right leg between the knee and the ankle laterally.





The patient saw her primary care physician, Dr. Berkowitz, and was started on 

clindamycin and doxycycline.  The patient began the medications yesterday and 

has noted some interval improvement in the redness swelling and pain over the 

lateral aspect of the right leg.





The patient is very concerned that she has blood poisoning and would like to 

have further definitive evaluation.





Past medical history is significant in that the patient has had a history of A. 

fib, TIAs, and takes digoxin and Eliquis.  Furthermore the patient's diabetic 

and is on metformin and acarbose.





Patient is allergic to sulfa and penicillins.





Allergies and Home Medications


Allergies


Coded Allergies:  


     Penicillins (Verified  Allergy, Unknown, 8/14/17)


     Sulfa (Sulfonamide Antibiotics) (Verified  Allergy, Unknown, 8/14/17)


     latex (Verified  Allergy, Unknown, 8/14/17)


     meperidine (Verified  Allergy, Unknown, 8/14/17)





Home Medications


Furosemide 20 Mg Tablet, 20 MG PO DAILY


   Prescribed by: RASHMI PORRAS on 7/22/18 3232





Patient Home Medication List


Home Medication List Reviewed:  Yes





Constitutional:  No chills, No fever


EENTM:  No ear pain


Respiratory:  No cough


Cardiovascular:  No palpitations


Gastrointestinal:  No abdominal pain, No nausea, No vomiting


Genitourinary:  no symptoms reported


Pregnant:  No


Musculoskeletal:  see HPI; No back pain


Skin:  see HPI, change in color


Psychiatric/Neurological:  No Symptoms Reported





Past Medical-Social-Family Hx


Past Med/Social Hx:  Reviewed Nursing Past Med/Soc Hx


Patient Social History


Alcohol Use:  Denies Use


Recreational Drug Use:  No


Smoking Status:  Never a Smoker


Recent Foreign Travel:  No


Contact w/Someone Who Travel:  No


Recent Infectious Disease Expo:  No


Recent Hopitalizations:  No


Physical Abuse:  No


Sexual Abuse:  No


Mistreated:  No


Fear:  No





Immunizations Up To Date


Tetanus Booster (TDap):  Unknown


PED Vaccines UTD:  Yes





Past Medical History


Surgeries:  Yes (BOWEL RESECTION 05/2017; HYST/BSO/APY)


Abdominal, Appendectomy, Bowel Surgery, Hysterectomy, Oophorectomy, Tubal 

Ligation


Respiratory:  No


Cardiac:  Yes


Atrial Fibrillation, Chronic Edema/Swelling, High Cholesterol, Hypertension


Neurological:  Yes


Neuropathy, TIA


GYN History:  Hysterectomy


Genitourinary:  No


Gastrointestinal:  Yes


Colitis, Gastroesophageal Reflux, Pancreatitis


Musculoskeletal:  No


Endocrine:  Yes


Diabetes, Non-Insulin dep


Cancer:  No


Psychosocial:  No


Nursing Suicide Risk Score:  0


Integumentary:  No


Blood Disorders:  No





Family Medical History


No Pertinent Family Hx





Physical Exam


Vital Signs





Vital Signs - First Documented








 8/18/18





 07:42


 


Temp 98.2


 


Pulse 70


 


Resp 18


 


B/P (MAP) 158/103 (121)


 


Pulse Ox 98





Capillary Refill : Less Than 3 Seconds


Height, Weight, BMI


Height: 5'2.00"


Weight: 102lbs. oz. 46.257477bf;  BMI


Method:Stated


General Appearance:  WD/WN


HEENT:  normal ENT inspection


Neck:  normal inspection


Cardiovascular:  regular rate, rhythm, other (I did not detect an irregular 

heartbeat when I auscultated the patient's heart.)


Respiratory:  chest non-tender, lungs clear, normal breath sounds


Gastrointestinal:  normal bowel sounds, non tender, soft


Back:  normal inspection


Legs:  right leg other (examination of the left hip and left lower extremity 

was unremarkable.  Examination of the right hip and leg demonstrated a small 

area of contusion without abrasion over the right SI joint with bruising from 

the hip to the ankle on the right.  In addition there was an abrasion noted 

that was approximately 1 inch in length over the lateral aspect of the 

midportion of the right leg between the knee and ankle.  There was some mild 

inflammation about the abrasion area suggestive of a cellulitis.)


Neurologic/Tendon:  normal sensation, normal motor functions


Neurologic/Psychiatric:  no motor/sensory deficits, alert, normal mood/affect, 

oriented x 3





Progress/Results/Core Measures


Results/Orders


Lab Results





Laboratory Tests








Test


 8/18/18


08:00 Range/Units


 


 


White Blood Count


 7.3 


 4.3-11.0


10^3/uL


 


Red Blood Count


 3.45 L


 4.35-5.85


10^6/uL


 


Hemoglobin 8.8 L 11.5-16.0  G/DL


 


Hematocrit 29 L 35-52  %


 


Mean Corpuscular Volume 84  80-99  FL


 


Mean Corpuscular Hemoglobin 26  25-34  PG


 


Mean Corpuscular Hemoglobin


Concent 30 L


 32-36  G/DL





 


Red Cell Distribution Width 29.4 H 10.0-14.5  %


 


Platelet Count


 219 


 130-400


10^3/uL


 


Mean Platelet Volume 11.0 H 7.4-10.4  FL


 


Neutrophils (%) (Auto) 63  42-75  %


 


Lymphocytes (%) (Auto) 24  12-44  %


 


Monocytes (%) (Auto) 12  0-12  %


 


Eosinophils (%) (Auto) 1  0-10  %


 


Basophils (%) (Auto) 0  0-10  %


 


Neutrophils # (Auto) 4.6  1.8-7.8  X 10^3


 


Lymphocytes # (Auto) 1.7  1.0-4.0  X 10^3


 


Monocytes # (Auto) 0.8  0.0-1.0  X 10^3


 


Eosinophils # (Auto)


 0.1 


 0.0-0.3


10^3/uL


 


Basophils # (Auto)


 0.0 


 0.0-0.1


10^3/uL


 


Erythrocyte Sedimentation Rate 29  0-30  MM/HR








My Orders





Orders - FARHAN BARON MD


Cbc With Automated Diff (8/18/18 07:52)


Erythrocyte Sedimentation Rate (8/18/18 07:52)


Us Venous Lower Ext Rt (8/18/18 07:52)





Vital Signs/I&O











 8/18/18





 07:42


 


Temp 98.2


 


Pulse 70


 


Resp 18


 


B/P (MAP) 158/103 (121)


 


Pulse Ox 98














Blood Pressure Mean:  121











Progress


Progress Note :  


   Time:  09:01


Progress Note


The patient's CBC and sedimentation rate were unremarkable.  The patient's 

ultrasound of the right lower extremity was similarly benign.





The patient was reassured.  She was asked to continue until completion with her 

oral antibiotics.  She was asked to follow up closely with Dr. Berkowitz.  I 

invited her to return in the emergency department if any further problems or 

questions.





I recommend that she remain on her Eliquis.





Cardiac monitor demonstrated the patient was in a normal sinus rhythm.





Despite the fact that the patient's in a normal sinus rhythm today with her 

history of 4 previous TIAs it may well be that Dr. Berkowitz will ultimately 

decided to keep her on both liquids and the digoxin.





Departure


Impression





 Primary Impression:  


 Traumatic ecchymosis of right lower leg


 Qualified Codes:  S80.11XA - Contusion of right lower leg, initial encounter


 Additional Impression:  


 Cellulitis of right lower extremity


Disposition:  01 HOME, SELF-CARE


Condition:  Unchanged





Departure-Patient Inst.


Decision time for Depature:  09:03


Referrals:  


NARCISO BERKOWITZ DO (PCP/Family)


Primary Care Physician


Patient Instructions:  Cellulitis (Skin Infection), Adult (DC)





Add. Discharge Instructions:  


Continue with the medications that you're taking as prescribed.  Close follow-

up with Dr. Berkowitz next week.  Return if any problems or questions.  All 

discharge instructions reviewed with patient and/or family. Voiced 

understanding.











FARHAN BARON MD Aug 18, 2018 08:06

## 2018-09-11 ENCOUNTER — HOSPITAL ENCOUNTER (OUTPATIENT)
Dept: HOSPITAL 75 - RAD | Age: 83
End: 2018-09-11
Attending: INTERNAL MEDICINE
Payer: COMMERCIAL

## 2018-09-11 DIAGNOSIS — I11.9: Primary | ICD-10-CM

## 2018-09-11 DIAGNOSIS — I08.3: ICD-10-CM

## 2018-09-11 DIAGNOSIS — I25.10: ICD-10-CM

## 2018-09-11 DIAGNOSIS — E11.9: ICD-10-CM

## 2018-09-11 DIAGNOSIS — I65.29: ICD-10-CM

## 2018-09-11 DIAGNOSIS — I27.20: ICD-10-CM

## 2018-09-11 PROCEDURE — 93306 TTE W/DOPPLER COMPLETE: CPT

## 2018-09-11 PROCEDURE — 93975 VASCULAR STUDY: CPT

## 2018-09-11 NOTE — DIAGNOSTIC IMAGING REPORT
PROCEDURE: US DOPPLER ABD/COMPLETE



INDICATION: Hypertension.



TECHNIQUE:

Multiple real-time grayscale sonographic images, color and duplex

Doppler images were obtained of the urinary system.



FINDINGS:

The aortic velocity is 95 cm/sec.



The RIGHT kidney measures 9.9 x 4.1 x 3.9 cm. Diffuse thinning

and increased echogenicity of the renal parenchyma. No

hydronephrosis. 

The right renal artery is visualized in its proximal, mid and

distal aspect. The maximum renal artery velocity is in its mid

aspect at 165 cm/sec. The maximum renal artery/aortic ratio is

1.7. 



The LEFT kidney measures 9.5 x 4.0 x 4.8 cm. Diffuse thinning and

increased echogenicity of the renal parenchyma. No

hydronephrosis.

The left renal artery is visualized in its proximal, mid and

distal aspect. The maximum renal artery velocity is proximally at

96 cm/sec. The maximum renal artery/aortic ratio is 1.0.



Urinary bladder is not imaged.



IMPRESSION:

1. Diffuse thinning of the renal parenchyma along with increased

echogenicity can be reflective of underlying chronic medical

renal disease.

2. No significantly elevated velocity to suggest a focal renal

artery stenosis at this time. However, there is somewhat

disproportionate increased velocity in the right mid renal

artery.



(RA/AO ratios > 3.0 may suggest potential hemodynamically

significant stenosis.)



Dictated by: 



  Dictated on workstation # [id number]

## 2018-11-25 ENCOUNTER — HOSPITAL ENCOUNTER (EMERGENCY)
Dept: HOSPITAL 75 - ER | Age: 83
Discharge: HOME | End: 2018-11-25
Payer: MEDICARE

## 2018-11-25 VITALS — DIASTOLIC BLOOD PRESSURE: 53 MMHG | SYSTOLIC BLOOD PRESSURE: 150 MMHG

## 2018-11-25 VITALS — BODY MASS INDEX: 18.77 KG/M2 | HEIGHT: 62 IN | WEIGHT: 102 LBS

## 2018-11-25 DIAGNOSIS — I10: ICD-10-CM

## 2018-11-25 DIAGNOSIS — Z88.2: ICD-10-CM

## 2018-11-25 DIAGNOSIS — E78.00: ICD-10-CM

## 2018-11-25 DIAGNOSIS — Z87.19: ICD-10-CM

## 2018-11-25 DIAGNOSIS — Z90.49: ICD-10-CM

## 2018-11-25 DIAGNOSIS — Z88.0: ICD-10-CM

## 2018-11-25 DIAGNOSIS — Z98.51: ICD-10-CM

## 2018-11-25 DIAGNOSIS — I48.91: ICD-10-CM

## 2018-11-25 DIAGNOSIS — E11.40: ICD-10-CM

## 2018-11-25 DIAGNOSIS — Z91.040: ICD-10-CM

## 2018-11-25 DIAGNOSIS — Z90.710: ICD-10-CM

## 2018-11-25 DIAGNOSIS — Z88.8: ICD-10-CM

## 2018-11-25 DIAGNOSIS — K59.00: Primary | ICD-10-CM

## 2018-11-25 LAB
ALBUMIN SERPL-MCNC: 4.2 GM/DL (ref 3.2–4.5)
ALP SERPL-CCNC: 81 U/L (ref 40–136)
ALT SERPL-CCNC: 22 U/L (ref 0–55)
APTT PPP: YELLOW S
BACTERIA #/AREA URNS HPF: (no result) /HPF
BASOPHILS # BLD AUTO: 0.1 10^3/UL (ref 0–0.1)
BASOPHILS NFR BLD AUTO: 1 % (ref 0–10)
BILIRUB SERPL-MCNC: 0.2 MG/DL (ref 0.1–1)
BILIRUB UR QL STRIP: NEGATIVE
BUN/CREAT SERPL: 23
CALCIUM SERPL-MCNC: 10.3 MG/DL (ref 8.5–10.1)
CHLORIDE SERPL-SCNC: 103 MMOL/L (ref 98–107)
CO2 SERPL-SCNC: 20 MMOL/L (ref 21–32)
CREAT SERPL-MCNC: 0.88 MG/DL (ref 0.6–1.3)
EOSINOPHIL # BLD AUTO: 1.5 10^3/UL (ref 0–0.3)
EOSINOPHIL NFR BLD AUTO: 12 % (ref 0–10)
ERYTHROCYTE [DISTWIDTH] IN BLOOD BY AUTOMATED COUNT: 15.7 % (ref 10–14.5)
FIBRINOGEN PPP-MCNC: CLEAR MG/DL
GFR SERPLBLD BASED ON 1.73 SQ M-ARVRAT: > 60 ML/MIN
GLUCOSE SERPL-MCNC: 185 MG/DL (ref 70–105)
GLUCOSE UR STRIP-MCNC: NEGATIVE MG/DL
HCT VFR BLD CALC: 37 % (ref 35–52)
HGB BLD-MCNC: 12 G/DL (ref 11.5–16)
KETONES UR QL STRIP: NEGATIVE
LEUKOCYTE ESTERASE UR QL STRIP: (no result)
LIPASE SERPL-CCNC: 33 U/L (ref 8–78)
LYMPHOCYTES # BLD AUTO: 3.7 X 10^3 (ref 1–4)
LYMPHOCYTES NFR BLD AUTO: 31 % (ref 12–44)
MANUAL DIFFERENTIAL PERFORMED BLD QL: NO
MCH RBC QN AUTO: 28 PG (ref 25–34)
MCHC RBC AUTO-ENTMCNC: 33 G/DL (ref 32–36)
MCV RBC AUTO: 85 FL (ref 80–99)
MONOCYTES # BLD AUTO: 1.3 X 10^3 (ref 0–1)
MONOCYTES NFR BLD AUTO: 11 % (ref 0–12)
NEUTROPHILS # BLD AUTO: 5.3 X 10^3 (ref 1.8–7.8)
NEUTROPHILS NFR BLD AUTO: 45 % (ref 42–75)
NITRITE UR QL STRIP: NEGATIVE
PH UR STRIP: 5 [PH] (ref 5–9)
PLATELET # BLD: 261 10^3/UL (ref 130–400)
PMV BLD AUTO: 11 FL (ref 7.4–10.4)
POTASSIUM SERPL-SCNC: 4.4 MMOL/L (ref 3.6–5)
PROT SERPL-MCNC: 7.1 GM/DL (ref 6.4–8.2)
PROT UR QL STRIP: (no result)
RBC # BLD AUTO: 4.33 10^6/UL (ref 4.35–5.85)
RBC #/AREA URNS HPF: (no result) /HPF
SODIUM SERPL-SCNC: 135 MMOL/L (ref 135–145)
SP GR UR STRIP: 1.01 (ref 1.02–1.02)
UROBILINOGEN UR-MCNC: NORMAL MG/DL
WBC # BLD AUTO: 11.9 10^3/UL (ref 4.3–11)
WBC #/AREA URNS HPF: (no result) /HPF

## 2018-11-25 PROCEDURE — 74177 CT ABD & PELVIS W/CONTRAST: CPT

## 2018-11-25 PROCEDURE — 84484 ASSAY OF TROPONIN QUANT: CPT

## 2018-11-25 PROCEDURE — 81000 URINALYSIS NONAUTO W/SCOPE: CPT

## 2018-11-25 PROCEDURE — 96361 HYDRATE IV INFUSION ADD-ON: CPT

## 2018-11-25 PROCEDURE — 80053 COMPREHEN METABOLIC PANEL: CPT

## 2018-11-25 PROCEDURE — 36415 COLL VENOUS BLD VENIPUNCTURE: CPT

## 2018-11-25 PROCEDURE — 83690 ASSAY OF LIPASE: CPT

## 2018-11-25 PROCEDURE — 93005 ELECTROCARDIOGRAM TRACING: CPT

## 2018-11-25 PROCEDURE — 96374 THER/PROPH/DIAG INJ IV PUSH: CPT

## 2018-11-25 PROCEDURE — 85025 COMPLETE CBC W/AUTO DIFF WBC: CPT

## 2018-11-25 NOTE — ED ABDOMINAL PAIN
General


Stated Complaint:  ABD/BACK PAIN


Source of Information:  Patient


Exam Limitations:  No Limitations





History of Present Illness


Date Seen by Provider:  2018


Time Seen by Provider:  10:33


Initial Comments


To ER per private vehicle with reports of epigastric abdominal pain that 

radiates through to her back that began earlier this morning. No fevers or 

chills. No nausea or vomiting. No bowel changes. She has a history of 

pancreatitis and states that this feels similar.


Timing/Duration:  4-6 Hours


Severity/Quality:  Severe


Location:  Epigastric


Associated Symptoms:  Back Pain; No Fever/Chills, No Nausea/Vomiting





Allergies and Home Medications


Allergies


Coded Allergies:  


     Penicillins (Verified  Allergy, Unknown, 17)


     Sulfa (Sulfonamide Antibiotics) (Verified  Allergy, Unknown, 17)


     latex (Verified  Allergy, Unknown, 17)


     meperidine (Verified  Allergy, Unknown, 17)





Home Medications


Furosemide 20 Mg Tablet, 20 MG PO DAILY


   Prescribed by: RASHMI PORRAS on 18 1812





Patient Home Medication List


Home Medication List Reviewed:  Yes





Review of Systems


Review of Systems


Constitutional:  see HPI; No chills, No fever; weakness


EENTM:  No Symptoms Reported


Respiratory:  No Symptoms Reported


Cardiovascular:  No Symptoms Reported


Gastrointestinal:  See HPI, Abdominal Pain; Denies Constipated, Denies Diarrhea

, Denies Nausea, Denies Vomiting


Genitourinary:  No Symptoms Reported


Musculoskeletal:  no symptoms reported


Skin:  no symptoms reported


Psychiatric/Neurological:  No Symptoms Reported


Endocrine:  No Symptoms Reported


Hematologic/Lymphatic:  No Symptoms Reported





Past Medical-Social-Family Hx


Patient Social History


Recent Foreign Travel:  No


Contact w/Someone Who Travel:  No


Recent Hopitalizations:  No





Immunizations Up To Date


Tetanus Booster (TDap):  Unknown


PED Vaccines UTD:  Yes





Past Medical History


Surgeries:  Yes (BOWEL RESECTION 2017; HYST/BSO/APY)


Abdominal, Appendectomy, Bowel Surgery, Hysterectomy, Oophorectomy, Tubal 

Ligation


Respiratory:  No


Cardiac:  Yes


Atrial Fibrillation, Chronic Edema/Swelling, High Cholesterol, Hypertension


Neurological:  Yes


Neuropathy, TIA


GYN History:  Hysterectomy


Genitourinary:  No


Gastrointestinal:  Yes


Colitis, Gastroesophageal Reflux, Pancreatitis


Musculoskeletal:  No


Endocrine:  Yes


Diabetes, Non-Insulin dep


Cancer:  No


Psychosocial:  No


Integumentary:  No


Blood Disorders:  No





Family Medical History


No Pertinent Family Hx





Physical Exam


Vital Signs





Vital Signs - First Documented








 18





 10:24


 


Temp 97.9


 


Pulse 90


 


Resp 18


 


B/P (MAP) 143/55 (84)


 


Pulse Ox 98


 


O2 Delivery Room Air





Capillary Refill :


Height/Weight/BMI


Height: 5'2.00"


Weight: 102lbs. oz. 46.602789ml;  BMI


Method:Stated


General Appearance:  WD/WN, no apparent distress


HEENT:  PERRL/EOMI, normal ENT inspection


Respiratory:  lungs clear, normal breath sounds, no respiratory distress, no 

accessory muscle use


Cardiovascular:  regular rate, rhythm, no murmur, other (she reports a history 

of atrial fibrillation for which she is on Eliquis but on auscultation her 

heart rate/rhythm is regular)


Gastrointestinal:  normal bowel sounds, soft, tenderness


Extremities:  normal range of motion, non-tender; No pedal edema


Neurologic/Psychiatric:  no motor/sensory deficits, alert, normal mood/affect, 

oriented x 3


Skin:  normal color, warm/dry





Progress/Results/Core Measures


Results/Orders


Lab Results





Laboratory Tests








Test


 18


10:28 18


11:52 Range/Units


 


 


White Blood Count


 11.9 H


 


 4.3-11.0


10^3/uL


 


Red Blood Count


 4.33 L


 


 4.35-5.85


10^6/uL


 


Hemoglobin 12.0   11.5-16.0  G/DL


 


Hematocrit 37   35-52  %


 


Mean Corpuscular Volume 85   80-99  FL


 


Mean Corpuscular Hemoglobin 28   25-34  PG


 


Mean Corpuscular Hemoglobin


Concent 33 


 


 32-36  G/DL





 


Red Cell Distribution Width 15.7 H  10.0-14.5  %


 


Platelet Count


 261 


 


 130-400


10^3/uL


 


Mean Platelet Volume 11.0 H  7.4-10.4  FL


 


Neutrophils (%) (Auto) 45   42-75  %


 


Lymphocytes (%) (Auto) 31   12-44  %


 


Monocytes (%) (Auto) 11   0-12  %


 


Eosinophils (%) (Auto) 12 H  0-10  %


 


Basophils (%) (Auto) 1   0-10  %


 


Neutrophils # (Auto) 5.3   1.8-7.8  X 10^3


 


Lymphocytes # (Auto) 3.7   1.0-4.0  X 10^3


 


Monocytes # (Auto) 1.3 H  0.0-1.0  X 10^3


 


Eosinophils # (Auto)


 1.5 H


 


 0.0-0.3


10^3/uL


 


Basophils # (Auto)


 0.1 


 


 0.0-0.1


10^3/uL


 


Sodium Level 135   135-145  MMOL/L


 


Potassium Level 4.4   3.6-5.0  MMOL/L


 


Chloride Level 103     MMOL/L


 


Carbon Dioxide Level 20 L  21-32  MMOL/L


 


Anion Gap 12   5-14  MMOL/L


 


Blood Urea Nitrogen 20 H  7-18  MG/DL


 


Creatinine


 0.88 


 


 0.60-1.30


MG/DL


 


Estimat Glomerular Filtration


Rate > 60 


 


  





 


BUN/Creatinine Ratio 23    


 


Glucose Level 185 H    MG/DL


 


Calcium Level 10.3 H  8.5-10.1  MG/DL


 


Corrected Calcium 10.1   8.5-10.1  MG/DL


 


Total Bilirubin 0.2   0.1-1.0  MG/DL


 


Aspartate Amino Transf


(AST/SGOT) 16 


 


 5-34  U/L





 


Alanine Aminotransferase


(ALT/SGPT) 22 


 


 0-55  U/L





 


Alkaline Phosphatase 81     U/L


 


Troponin I < 0.30   <0.30  NG/ML


 


Total Protein 7.1   6.4-8.2  GM/DL


 


Albumin 4.2   3.2-4.5  GM/DL


 


Lipase 33   8-78  U/L


 


Urine Color  YELLOW   


 


Urine Clarity  CLEAR   


 


Urine pH  5  5-9  


 


Urine Specific Gravity  1.010 L 1.016-1.022  


 


Urine Protein  1+ H NEGATIVE  


 


Urine Glucose (UA)  NEGATIVE  NEGATIVE  


 


Urine Ketones  NEGATIVE  NEGATIVE  


 


Urine Nitrite  NEGATIVE  NEGATIVE  


 


Urine Bilirubin  NEGATIVE  NEGATIVE  


 


Urine Urobilinogen  NORMAL  NORMAL  MG/DL


 


Urine Leukocyte Esterase  1+ H NEGATIVE  


 


Urine RBC (Auto)  NEGATIVE  NEGATIVE  


 


Urine RBC  NONE   /HPF


 


Urine WBC  0-2   /HPF


 


Urine Crystals  NONE   /LPF


 


Urine Bacteria  MODERATE H  /HPF


 


Urine Casts  NONE   /LPF


 


Urine Mucus  NEGATIVE   /LPF


 


Urine Culture Indicated  NO   








My Orders





Orders - DAVID CALLEJAS


Cbc With Automated Diff (18 10:31)


Comprehensive Metabolic Panel (18 10:31)


Lipase (18 10:31)


Troponin I (18 10:31)


Iv Heplock-Insert (Order) (18 10:31)


Ct Abdomen/Pelvis W (18 10:31)


Ns Iv 1000 Ml (Sodium Chloride 0.9%) (18 10:45)


Fentanyl  Injection (Sublimaze Injection (18 10:45)


Iohexol Injection (Omnipaque 350 Mg/Ml 1 (18 11:15)


Contrast Received (Contrast Received) (18 11:15)


Sodium Chloride Flush (Catheter Flush Sy (18 11:15)


Ns (Ivpb) (Sodium Chloride 0.9%) (18 11:15)


Ekg Tracing (18 11:11)





Medications Given in ED





Current Medications








 Medications  Dose


 Ordered  Sig/Hina


 Route  Start Time


 Stop Time Status Last Admin


Dose Admin


 


 Fentanyl Citrate  50 mcg  ONCE  ONCE


 IVP  18 10:45


 18 10:46 DC 18 10:44


50 MCG


 


 Iohexol  100 ml  ONCE  ONCE


 IV  18 11:15


 18 11:16 DC 18 11:45


60 ML


 


 Sodium Chloride  10 ml  AS NEEDED  PRN


 IV  18 11:15


    18 11:45


10 ML


 


 Sodium Chloride  250 ml  ONCE  ONCE


 IV  18 11:15


 18 11:16 DC 18 11:45


80 ML








Vital Signs/I&O











 18





 10:24


 


Temp 97.9


 


Pulse 90


 


Resp 18


 


B/P (MAP) 143/55 (84)


 


Pulse Ox 98


 


O2 Delivery Room Air











Diagnostic Imaging





   Diagonstic Imaging:  CT


Comments


NAME:   TERESSA SINGH


MED REC#:   [id number]


ACCOUNT#:   [id number]


PT STATUS:   REG ER


:   1934


PHYSICIAN:   DAVID CALLEJAS


ADMIT DATE:   18/ER


 ***Draft***


Date of Exam:18





CT ABDOMEN/PELVIS W








PROCEDURE: CT abdomen and pelvis with contrast.





TECHNIQUE: Multiple contiguous axial images were obtained through


the abdomen and pelvis after administration of intravenous


contrast. 





INDICATION:  Abdominal pain radiating posteriorly.





COMPARISON: CT abdomen and pelvis performed on 2017.





FINDINGS:


Unchanged 3 mm right lower lobe pulmonary nodule (image 1 series


2). There is mild dependent atelectasis in the right lower lobe.


The visualized heart is normal in size.





The liver, spleen, pancreas, and adrenal glands are normal. The


gallbladder is contracted and not well evaluated. No calcified


gallstone. No intrahepatic or extrahepatic biliary ductal


dilatation.





The kidneys enhance symmetrically, without evidence of renal


calculus, hydronephrosis, or suspicious mass. The visualized


ureters are normal.





The stomach is moderately distended with fluid. Postsurgical


change is noted in the ascending colon, likely related to prior


appendectomy and partial colectomy. No bowel obstruction or


inflammatory change in the bowel. A moderate amount of retained


stool is noted throughout the colon.





No pneumoperitoneum, abdominal free fluid, or loculated


collection. No lymphadenopathy. The bladder is normal. The uterus


is surgically absent. No adnexal mass or pelvic free fluid.





Abdominal wall is unremarkable. There is mild calcified


atherosclerotic plaque involving the aorta, without aneurysmal


dilatation. No evidence of venous thrombosis. There is mild


degenerative change of the spine. There is unchanged mild


anterolisthesis of L4 on L5, likely degenerative in nature. No


acute osseous abnormality.





IMPRESSION:


No acute abdominal or pelvic pathology.





Moderate amount of retained stool throughout the colon, possibly


reflecting constipation.





  Dictated on workstation # [id number]








Dict:   18 1156


Trans:   18 1218


CVB [id number]





Interpreted by:     DONNA DE LOS SANTOS DO


Electronically signed by:





Departure


Communication (Admissions)


1224-after 1 dose of fentanyl her abdominal pain is completely gone. Labs are 

unremarkable. CT is unremarkable.


1317-she remains pain-free. She is still shaky but her labs are unremarkable. 

We will discharged home. She is scheduled for follow-up with Dr. Dr. Berkowitz 

tomorrow morning.





Impression





 Primary Impression:  


 abdominal pain


 Additional Impression:  


 Constipation


Disposition:  01 HOME, SELF-CARE


Condition:  Stable





Departure-Patient Inst.


Decision time for Depature:  12:24


Referrals:  


NARCISO BERKOWITZ DO (PCP/Family)


Primary Care Physician


Patient Instructions:  Acute Abdomen (Belly Pain), Adult (DC)





Add. Discharge Instructions:  


1. Follow-up with Dr. Dr. Berkowitz to discuss your blood pressure control 

issues


2. Return to ER for any worsening abdominal pain or other concerns.


3. You do appear to be constipated on the CT scan so you should take 1-2 capful'

s of MiraLAX twice daily for the next 2-3 days.





Copy


Copies To 1:   NARCISO BERKOWITZ PETER J APRN 2018 10:35

## 2018-11-25 NOTE — DIAGNOSTIC IMAGING REPORT
PROCEDURE: CT abdomen and pelvis with contrast.



TECHNIQUE: Multiple contiguous axial images were obtained through

the abdomen and pelvis after administration of intravenous

contrast. 



INDICATION:  Abdominal pain radiating posteriorly.



COMPARISON: CT abdomen and pelvis performed on 08/14/2017.



FINDINGS:

Unchanged 3 mm right lower lobe pulmonary nodule (image 1 series

2). There is mild dependent atelectasis in the right lower lobe.

The visualized heart is normal in size.



The liver, spleen, pancreas, and adrenal glands are normal. The

gallbladder is contracted and not well evaluated. No calcified

gallstone. No intrahepatic or extrahepatic biliary ductal

dilatation.



The kidneys enhance symmetrically, without evidence of renal

calculus, hydronephrosis, or suspicious mass. The visualized

ureters are normal.



The stomach is moderately distended with fluid. Postsurgical

change is noted in the ascending colon, likely related to prior

appendectomy and partial colectomy. No bowel obstruction or

inflammatory change in the bowel. A moderate amount of retained

stool is noted throughout the colon.



No pneumoperitoneum, abdominal free fluid, or loculated

collection. No lymphadenopathy. The bladder is normal. The uterus

is surgically absent. No adnexal mass or pelvic free fluid.



Abdominal wall is unremarkable. There is mild calcified

atherosclerotic plaque involving the aorta, without aneurysmal

dilatation. No evidence of venous thrombosis. There is mild

degenerative change of the spine. There is unchanged mild

anterolisthesis of L4 on L5, likely degenerative in nature. No

acute osseous abnormality.



IMPRESSION:

No acute abdominal or pelvic pathology.



Moderate amount of retained stool throughout the colon, possibly

reflecting constipation.



Dictated by: 



  Dictated on workstation # [id number]

## 2019-01-12 ENCOUNTER — HOSPITAL ENCOUNTER (OUTPATIENT)
Dept: HOSPITAL 75 - ER | Age: 84
Setting detail: OBSERVATION
LOS: 2 days | Discharge: HOME | End: 2019-01-14
Attending: INTERNAL MEDICINE | Admitting: INTERNAL MEDICINE
Payer: MEDICARE

## 2019-01-12 VITALS — SYSTOLIC BLOOD PRESSURE: 140 MMHG | DIASTOLIC BLOOD PRESSURE: 53 MMHG

## 2019-01-12 VITALS — SYSTOLIC BLOOD PRESSURE: 169 MMHG | DIASTOLIC BLOOD PRESSURE: 74 MMHG

## 2019-01-12 VITALS — DIASTOLIC BLOOD PRESSURE: 78 MMHG | SYSTOLIC BLOOD PRESSURE: 175 MMHG

## 2019-01-12 VITALS — WEIGHT: 103.25 LBS | BODY MASS INDEX: 17.63 KG/M2 | HEIGHT: 64 IN

## 2019-01-12 VITALS — SYSTOLIC BLOOD PRESSURE: 135 MMHG | DIASTOLIC BLOOD PRESSURE: 58 MMHG

## 2019-01-12 VITALS — DIASTOLIC BLOOD PRESSURE: 71 MMHG | SYSTOLIC BLOOD PRESSURE: 193 MMHG

## 2019-01-12 VITALS — SYSTOLIC BLOOD PRESSURE: 135 MMHG | DIASTOLIC BLOOD PRESSURE: 57 MMHG

## 2019-01-12 VITALS — DIASTOLIC BLOOD PRESSURE: 73 MMHG | SYSTOLIC BLOOD PRESSURE: 179 MMHG

## 2019-01-12 VITALS — SYSTOLIC BLOOD PRESSURE: 193 MMHG | DIASTOLIC BLOOD PRESSURE: 71 MMHG

## 2019-01-12 DIAGNOSIS — G62.9: ICD-10-CM

## 2019-01-12 DIAGNOSIS — E11.9: ICD-10-CM

## 2019-01-12 DIAGNOSIS — I16.1: ICD-10-CM

## 2019-01-12 DIAGNOSIS — I10: ICD-10-CM

## 2019-01-12 DIAGNOSIS — I48.0: ICD-10-CM

## 2019-01-12 DIAGNOSIS — R29.700: ICD-10-CM

## 2019-01-12 DIAGNOSIS — G45.9: Primary | ICD-10-CM

## 2019-01-12 DIAGNOSIS — E78.00: ICD-10-CM

## 2019-01-12 DIAGNOSIS — K21.9: ICD-10-CM

## 2019-01-12 DIAGNOSIS — I65.23: ICD-10-CM

## 2019-01-12 DIAGNOSIS — I25.10: ICD-10-CM

## 2019-01-12 DIAGNOSIS — E78.5: ICD-10-CM

## 2019-01-12 LAB
ALBUMIN SERPL-MCNC: 4.1 GM/DL (ref 3.2–4.5)
ALP SERPL-CCNC: 66 U/L (ref 40–136)
ALT SERPL-CCNC: 20 U/L (ref 0–55)
APTT BLD: 26 SEC (ref 24–35)
APTT PPP: YELLOW S
BACTERIA #/AREA URNS HPF: NEGATIVE /HPF
BASOPHILS # BLD AUTO: 0.1 10^3/UL (ref 0–0.1)
BASOPHILS NFR BLD AUTO: 1 % (ref 0–10)
BILIRUB SERPL-MCNC: 0.4 MG/DL (ref 0.1–1)
BILIRUB UR QL STRIP: NEGATIVE
BUN/CREAT SERPL: 24
CALCIUM SERPL-MCNC: 9.3 MG/DL (ref 8.5–10.1)
CHLORIDE SERPL-SCNC: 99 MMOL/L (ref 98–107)
CO2 SERPL-SCNC: 21 MMOL/L (ref 21–32)
CREAT SERPL-MCNC: 0.7 MG/DL (ref 0.6–1.3)
D DIMER PPP FEU-MCNC: 0.21 UG/ML (ref 0–0.49)
EOSINOPHIL # BLD AUTO: 0.4 10^3/UL (ref 0–0.3)
EOSINOPHIL NFR BLD AUTO: 6 % (ref 0–10)
ERYTHROCYTE [DISTWIDTH] IN BLOOD BY AUTOMATED COUNT: 16.2 % (ref 10–14.5)
FIBRINOGEN PPP-MCNC: CLEAR MG/DL
GFR SERPLBLD BASED ON 1.73 SQ M-ARVRAT: > 60 ML/MIN
GLUCOSE SERPL-MCNC: 97 MG/DL (ref 70–105)
GLUCOSE UR STRIP-MCNC: NEGATIVE MG/DL
HCT VFR BLD CALC: 33 % (ref 35–52)
HGB BLD-MCNC: 11 G/DL (ref 11.5–16)
INR PPP: 1.1 (ref 0.8–1.4)
KETONES UR QL STRIP: NEGATIVE
LEUKOCYTE ESTERASE UR QL STRIP: (no result)
LYMPHOCYTES # BLD AUTO: 2.7 X 10^3 (ref 1–4)
LYMPHOCYTES NFR BLD AUTO: 38 % (ref 12–44)
MANUAL DIFFERENTIAL PERFORMED BLD QL: NO
MCH RBC QN AUTO: 29 PG (ref 25–34)
MCHC RBC AUTO-ENTMCNC: 33 G/DL (ref 32–36)
MCV RBC AUTO: 86 FL (ref 80–99)
MONOCYTES # BLD AUTO: 1.1 X 10^3 (ref 0–1)
MONOCYTES NFR BLD AUTO: 15 % (ref 0–12)
NEUTROPHILS # BLD AUTO: 2.9 X 10^3 (ref 1.8–7.8)
NEUTROPHILS NFR BLD AUTO: 41 % (ref 42–75)
NITRITE UR QL STRIP: NEGATIVE
PH UR STRIP: 6.5 [PH] (ref 5–9)
PLATELET # BLD: 223 10^3/UL (ref 130–400)
PMV BLD AUTO: 11.3 FL (ref 7.4–10.4)
POTASSIUM SERPL-SCNC: 4.7 MMOL/L (ref 3.6–5)
PROT SERPL-MCNC: 6.4 GM/DL (ref 6.4–8.2)
PROT UR QL STRIP: NEGATIVE
PROTHROMBIN TIME: 14.7 SEC (ref 12.2–14.7)
RBC # BLD AUTO: 3.85 10^6/UL (ref 4.35–5.85)
RBC #/AREA URNS HPF: (no result) /HPF
SODIUM SERPL-SCNC: 131 MMOL/L (ref 135–145)
SP GR UR STRIP: 1.01 (ref 1.02–1.02)
SQUAMOUS #/AREA URNS HPF: (no result) /HPF
UROBILINOGEN UR-MCNC: NORMAL MG/DL
WBC # BLD AUTO: 7.2 10^3/UL (ref 4.3–11)
WBC #/AREA URNS HPF: (no result) /HPF

## 2019-01-12 PROCEDURE — 82962 GLUCOSE BLOOD TEST: CPT

## 2019-01-12 PROCEDURE — 80061 LIPID PANEL: CPT

## 2019-01-12 PROCEDURE — 81000 URINALYSIS NONAUTO W/SCOPE: CPT

## 2019-01-12 PROCEDURE — 70450 CT HEAD/BRAIN W/O DYE: CPT

## 2019-01-12 PROCEDURE — 96374 THER/PROPH/DIAG INJ IV PUSH: CPT

## 2019-01-12 PROCEDURE — 85730 THROMBOPLASTIN TIME PARTIAL: CPT

## 2019-01-12 PROCEDURE — 85379 FIBRIN DEGRADATION QUANT: CPT

## 2019-01-12 PROCEDURE — 36415 COLL VENOUS BLD VENIPUNCTURE: CPT

## 2019-01-12 PROCEDURE — 87081 CULTURE SCREEN ONLY: CPT

## 2019-01-12 PROCEDURE — 80048 BASIC METABOLIC PNL TOTAL CA: CPT

## 2019-01-12 PROCEDURE — 85025 COMPLETE CBC W/AUTO DIFF WBC: CPT

## 2019-01-12 PROCEDURE — 80053 COMPREHEN METABOLIC PANEL: CPT

## 2019-01-12 PROCEDURE — 84484 ASSAY OF TROPONIN QUANT: CPT

## 2019-01-12 PROCEDURE — 71045 X-RAY EXAM CHEST 1 VIEW: CPT

## 2019-01-12 PROCEDURE — 80162 ASSAY OF DIGOXIN TOTAL: CPT

## 2019-01-12 PROCEDURE — 83735 ASSAY OF MAGNESIUM: CPT

## 2019-01-12 PROCEDURE — 93005 ELECTROCARDIOGRAM TRACING: CPT

## 2019-01-12 PROCEDURE — 84100 ASSAY OF PHOSPHORUS: CPT

## 2019-01-12 PROCEDURE — 85610 PROTHROMBIN TIME: CPT

## 2019-01-12 NOTE — DIAGNOSTIC IMAGING REPORT
PROCEDURE: CT head wo r/o stroke.



TECHNIQUE: Multiple contiguous axial images were obtained through

the brain without the use of intravenous contrast.



INDICATION: Stroke.. 



COMPARISON: None.



FINDINGS:  No CT evidence for territorial infarction.

Intracranial vascular calcifications. No intracranial hemorrhage,

mass effect, hydrocephalus or extra-axial fluid collections.

Moderate generalized parenchymal volume loss and leukoaraiosis.

No acute osseous findings. The visualized paranasal sinuses and

mastoids are clear.



IMPRESSION: No acute intracranial CT findings.



Dictated by: 



  Dictated on workstation # [id number]

## 2019-01-12 NOTE — CONSULTATION-CARDIOLOGY
HPI-Cardiology


Cardiology Consultation:


Date of Consultation


1/12/19


Time Seen by a Provider:  20:30


Date of Admission





Attending Physician


Geraldine Elias DO


Admitting Physician


Fernando Deshpande DO


Consulting Physician


DEONDRE JEFFERSON MD, MA, FACP, FAC, [facility], [facility]





Physician requesting consult: Dr Elias





HPI:


Chief Complaint:


Reason for consultation: Management of hypertension


HPI:





85 yo woman who presented to the ER within an hour of speech impairment and has 

been evaluated for stroke in the ER. Dr Pickard and Dr Elias, in phone 

consultation with the Neuro Svce at [facility], have determined that this not an 

acute CVA requiring intervention. They have determined this to be hypertensive 

encephalopathy and have asked us to help with management of blood pressure.


Ms Garrison states that bp has been considerably elevated at home. She denies 

cp or palp or syncope or focal weakness. She feels that her speech has 

recovered to normal.





Review of Systems-Cardiology


Review of Systems


Constitutional:  No tiredness, No weight loss, No weight gain


Eyes:  No vision change


Ears/Nose/Throat:  No ear discharge, No nasal drainage, No recent hearing loss


Respiratory:  As described under HPI


Cardiovascular:  As described under HPI


Gastrointestinal:  No diarrhea, No nausea, No vomiting


Genitourinary:  No dysuria, No hematuria


Musculoskeletal:  back pain (chronic); No joint pain


Skin:  No rash, No ulcerations


Psychiatric/Neurological:  As described under HPI


Hematologic:  No bleeding abnormalities





PMH-Social-Family Hx


Patient Social History


Alcohol Use:  Denies Use


Recreational Drug Use:  No


Smoking Status:  Never a Smoker


Recent Foreign Travel:  No


Recent Infectious Disease Expo:  No


Hospitalization with Isolation:  Denies





Immunizations Up To Date


Tetanus Booster (TDap):  Unknown





Past Medical History


PMH


As described under Assessment.





Family Medical History


Family Medical History:  


She has not reported any fam h/o early CAD or SCD





Allergies and Home Medications


Allergies


Coded Allergies:  


     Penicillins (Verified  Allergy, Unknown, 8/14/17)


     Sulfa (Sulfonamide Antibiotics) (Verified  Allergy, Unknown, 8/14/17)


     latex (Verified  Allergy, Unknown, 8/14/17)


     meperidine (Verified  Allergy, Unknown, 8/14/17)





Home Medications


Furosemide 20 Mg Tablet, 20 MG PO DAILY


   Prescribed by: RASHMI PORRAS on 7/22/18 3892





Patient Home Medication List


Home Medication List Reviewed:  Yes





Physical Exam-Cardiology


Physical Exam


Vital Signs/I&O











 1/12/19 1/12/19





 18:16 20:13


 


Temp 98.3 


 


Pulse 68 70


 


Resp 18 16


 


B/P (MAP) 209/87 (127) 175/78 (110)


 


Pulse Ox  98





Capillary Refill : Less Than 3 Seconds


Constitutional:  AAO x 3, well-developed, well-nourished


HEENT:  PERRL, EOMI; No xanthelasmas are seen


Neck:  carotid pulses are 2 + bilaterally, with good upstrokes


Respiratory:  No accessory muscle use; lungs clear to auscultation


Cardiovascular:  regular rate-rhythm, S1 and S2, systolic murmur (soft ROMY at 

card base)


Gastrointestinal:  No tender; soft; No guarding, No rebound; audible bowel 

sounds


Extremities:  No clubbing, No cyanosis, No significant edema


Neurologic/Psychiatric:  oriented x 3, grossly intact, power is 5/5 both on 

sides


Skin:  No rash on exposed areas, No ulcerations on exposed areas





Data Review


Labs


Laboratory Tests


1/12/19 18:12: 


White Blood Count 7.2, Red Blood Count 3.85L, Hemoglobin 11.0L, Hematocrit 33L, 

Mean Corpuscular Volume 86, Mean Corpuscular Hemoglobin 29, Mean Corpuscular 

Hemoglobin Concent 33, Red Cell Distribution Width 16.2H, Platelet Count 223, 

Mean Platelet Volume 11.3H, Neutrophils (%) (Auto) 41L, Lymphocytes (%) (Auto) 

38, Monocytes (%) (Auto) 15H, Eosinophils (%) (Auto) 6, Basophils (%) (Auto) 1, 

Neutrophils # (Auto) 2.9, Lymphocytes # (Auto) 2.7, Monocytes # (Auto) 1.1H, 

Eosinophils # (Auto) 0.4H, Basophils # (Auto) 0.1, Prothrombin Time 14.7, INR 

Comment 1.1, Activated Partial Thromboplast Time 26, D-Dimer 0.21, Sodium Level 

131L, Potassium Level 4.7, Chloride Level 99, Carbon Dioxide Level 21, Anion 

Gap 11, Blood Urea Nitrogen 17, Creatinine 0.70, Estimat Glomerular Filtration 

Rate > 60, BUN/Creatinine Ratio 24, Glucose Level 97, Glucometer 99, Calcium 

Level 9.3, Corrected Calcium 9.2, Total Bilirubin 0.4, Aspartate Amino Transf (

AST/SGOT) 17, Alanine Aminotransferase (ALT/SGPT) 20, Alkaline Phosphatase 66, 

Troponin I < 0.028, Total Protein 6.4, Albumin 4.1, Digoxin Level 0.78L


1/12/19 19:03: 


Urine Color YELLOW, Urine Clarity CLEAR, Urine pH 6.5, Urine Specific Gravity 

1.010L, Urine Protein NEGATIVE, Urine Glucose (UA) NEGATIVE, Urine Ketones 

NEGATIVE, Urine Nitrite NEGATIVE, Urine Bilirubin NEGATIVE, Urine Urobilinogen 

NORMAL, Urine Leukocyte Esterase 1+H, Urine RBC (Auto) NEGATIVE, Urine RBC NONE

, Urine WBC 0-2, Urine Squamous Epithelial Cells 0-2, Urine Crystals NONE, 

Urine Bacteria NEGATIVE, Urine Casts NONE, Urine Mucus NEGATIVE, Urine Culture 

Indicated NO





Laboratory Tests


1/12/19 18:12











A/P-Cardiology


Assessment/Admission Diagnosis





Transient speech impairment. Her hospitalist, Dr Elias, has determined that 

this not an acute CVA requiring intervention





Severe hypertension with suspected hypertensive encephalopathy





Paroxysmal atrial fibrillation, currently in sinus rhythm, maintained on 

digoxin and Eliquis by Dr Hutton





History of cardiac catheterization in July 2015 in [facility]: reported 

as mild coronary artery disease with tortuous coronaries; no significant 

obstructive disease





Hyperlipidemia treated with statins that are managed by Dr Hutton





Diabetes mellitus, followed and managed by primary care physician





1-39% bilat carotid stenoses, per u/s at Dr Hutton's in Aug 2018





Discussion and Recomendations





* Treat hypertension with iv nitroprusside while titrating oral meds


* Continue apixaban for stroke prophylaxis


* Monitor labs





Clinical Quality Measures


Stroke:


Date of last known well:  Jan 12, 2019


Time of last known well:  17:15


Symptoms onset unknown:  No











DEONDRE JEFFERSON MD FACP FAC CCDS Jan 12, 2019 20:32

## 2019-01-12 NOTE — DIAGNOSTIC IMAGING REPORT
EXAM: CHEST 1 VIEW, AP/PA ONLY



INDICATION: Chest pain.



COMPARISON: Chest radiograph 07/22/2018.



FINDINGS: Normal heart size and central pulmonary vascularity. No

focal pulmonary opacity, pleural effusion or pneumothorax. No

acute osseous findings.



IMPRESSION: No acute cardiopulmonary findings.



Dictated by: 



  Dictated on workstation # [id number]

## 2019-01-12 NOTE — ED NEUROLOGICAL PROBLEM
General


Stated Complaint:  POSS STROKE


Source:  patient, EMS, old records


Exam Limitations:  no limitations





History of Present Illness


Date Seen by Provider:  2019


Time Seen by Provider:  17:45


Initial Comments


The patient presents to ER by EMS from home where she was witnessed by family 

approximately 30 minutes prior with last known well time of 1715 to have some 

slurred speech difficulty making her point. By time EMS arrived it had passed. 

She walked to the [facility] and got on the cot on her own power. She has a history 

of TIAs with her last one being about 5 years ago. She dispensed time at 

[facility] doing some workup in the hospital. Recently she and her primary care 

doctor had been working hard to get her blood pressure under control she says 

the last few weeks every time she checks in the morning this top number has 

been above 200. She is on clonidine and lisinopril. She says she has not been 

missing any doses and has been very regular about taking it. They've been 

trying different regimens. She does not smoke nor have a history of stroke but 

she does have a history of TIAs. She has no history of coronary artery disease. 

Blood sugar is 105 per EMS when they arrived. The patient uses Eliquis 

routinely for prevention of stroke secondary to atrial fibrillation paroxysmal 

type. Patient's on 20 mg lisinopril and 0.1 mg clonidine as well as 125 g of 

digoxin. She no longer takes Lasix, Imdur or nifedipine.





Allergies and Home Medications


Allergies


Coded Allergies:  


     Penicillins (Verified  Allergy, Unknown, 17)


     Sulfa (Sulfonamide Antibiotics) (Verified  Allergy, Unknown, 17)


     latex (Verified  Allergy, Unknown, 17)


     meperidine (Verified  Allergy, Unknown, 17)





Home Medications


Furosemide 20 Mg Tablet, 20 MG PO DAILY


   Prescribed by: RASHMI PORRAS on 18 1812





Patient Home Medication List


Home Medication List Reviewed:  Yes





Review of Systems


Review of Systems


Constitutional:  No chills, No fever, No malaise


Eyes:  Denies Blindness, Denies Drainage


Ears, Nose, Mouth, Throat:  denies ear pain, denies ear discharge


Respiratory:  No cough, No dyspnea on exertion


Cardiovascular:  No chest pain


Gastrointestinal:  No abdominal pain, No constipation, No nausea


Genitourinary:  No discharge, No dysuria


Musculoskeletal:  No back pain, No joint pain


Skin:  No pruritus, No rash





Past Medical-Social-Family Hx


Patient Social History


Alcohol Use:  Denies Use


Recreational Drug Use:  No


Smoking Status:  Never a Smoker


Recent Foreign Travel:  No


Contact w/Someone Who Travel:  No


Recent Hopitalizations:  No





Immunizations Up To Date


Tetanus Booster (TDap):  Unknown


PED Vaccines UTD:  Yes





Past Medical History


Surgeries:  Yes (BOWEL RESECTION 2017; HYST/BSO/APY)


Abdominal, Appendectomy, Bowel Surgery, Hysterectomy, Oophorectomy, Tubal 

Ligation


Respiratory:  No


Cardiac:  Yes


Atrial Fibrillation, Chronic Edema/Swelling, High Cholesterol, Hypertension


Neurological:  Yes


Neuropathy, TIA


GYN History:  Hysterectomy


Genitourinary:  No


Gastrointestinal:  Yes


Colitis, Gastroesophageal Reflux, Pancreatitis


Musculoskeletal:  No


Endocrine:  Yes


Diabetes, Non-Insulin dep


Cancer:  No


Psychosocial:  No


Integumentary:  No


Blood Disorders:  No





Family Medical History


No Pertinent Family Hx





Physical Exam


Vital Signs





Vital Signs - First Documented








 19





 18:16


 


Temp 98.3


 


Pulse 68


 


Resp 18


 


B/P (MAP) 209/87 (127)





Capillary Refill :


Height, Weight, BMI


Height: 5'2.00"


Weight: 102lbs. oz. 46.577290pd;  BMI


Method:Stated


General Appearance:  WD/WN, no apparent distress


HEENT:  PERRL/EOMI, normal ENT inspection, TMs normal, pharynx normal


Neck:  non-tender, full range of motion, supple, normal inspection


Respiratory:  chest non-tender, lungs clear, normal breath sounds, no 

respiratory distress, no accessory muscle use


Cardiovascular:  normal peripheral pulses, regular rate, rhythm, no edema, no 

gallop, no murmur


Peripheral Pulses:  2+ Dorsalis Pedis (R), 2+ Left Dors-Pedis (L), 2+ Radial 

Pulses (R), 2+ Radial Pulses (L)


Gastrointestinal:  non tender, soft


Extremities:  normal range of motion, normal capillary refill


Neurologic/Psychiatric:  CNs II-XII nml as tested, no motor/sensory deficits, 

alert, normal mood/affect, oriented x 3


Crainal Nerves:  normal hearing, normal speech, PERRL


Coordination/Gait:  normal finger to nose, normal gait


Motor/Sensory:  no motor deficit, no sensory deficit, no pronator drift


Skin:  normal color, warm/dry





Stroke


Onset of Symptoms


Date of Onset of Symptoms:  2019


Time of Symptom Onset:  17:15


Onset of Symptoms:  Yes


Symptoms onset unknown:  No





NIH Stroke Scale Assessment





 Select: Initial Level of Consciousness: 0=Alert (0), Level of Consciousness-

Questions: 0=Answers both month/age (0), LOC Commands: 0=Performs both tasks (0)

, Gaze: Normal (0), Visual Fields: 0=No visual loss (0), Facial Movement (

Facial Paresis): 0=Normal symmetrical mnt (0), Motor Function-Arms Right: 0=No 

drift (0), Motor Function-Arms Left: 0=No drift (0), Motor Function-Legs Right: 

0=No drift (0), Motor Function-Legs Left: 0=No drift (0), Limb Ataxia: 0=Absent 

(0), Sensory: 0=Normal:no loss (0), Best Language: 0=No aphasia (0), Dysarthria

: 0=Normal (0), Extinction & Inattention: 0=No abnormality (0), Total: 0





Stroke Thrombolytic Exclusion


Age 18 or Over:  Yes


Acute intenal hemorrhage:  No


History of CVA:  No


Uncontrolled Coagulation Defec:  No


Intracranial Hemorrhage:  No


Severe Hypertension:  No


GI or  Bleed:  No


Subarachnoid Hemorrhage:  No


Intracranial Neoplasm/Aneurysm:  No


Oral Anticoagulants:  Yes


Surgery or Trauma:  No


Puncture of Non-Compressible V:  No


Recent CPR:  No


Diabetic Hemorrhagic Retinopat:  No


Organ Biopsy:  No


Recent Obstetric Delivery:  No


Glucose:  No (105)


Significant Hepatic Dysfunctio:  No


NIH Stoke Scale >22:  No


Bacterial Endocarditis:  No


Pericarditis:  No


Improving Symptoms:  No


Platelets:  No


TPA Contraindication:  Yes (Eliquis)





IV - TPa Received


IV - TPa Procedure Performed?:  No





Progress/Results/Core Measures


Results/Orders


Lab Results





Laboratory Tests








Test


 19


18:12 Range/Units


 


 


White Blood Count


 7.2 


 4.3-11.0


10^3/uL


 


Red Blood Count


 3.85 L


 4.35-5.85


10^6/uL


 


Hemoglobin 11.0 L 11.5-16.0  G/DL


 


Hematocrit 33 L 35-52  %


 


Mean Corpuscular Volume 86  80-99  FL


 


Mean Corpuscular Hemoglobin 29  25-34  PG


 


Mean Corpuscular Hemoglobin


Concent 33 


 32-36  G/DL





 


Red Cell Distribution Width 16.2 H 10.0-14.5  %


 


Platelet Count


 223 


 130-400


10^3/uL


 


Mean Platelet Volume 11.3 H 7.4-10.4  FL


 


Neutrophils (%) (Auto) 41 L 42-75  %


 


Lymphocytes (%) (Auto) 38  12-44  %


 


Monocytes (%) (Auto) 15 H 0-12  %


 


Eosinophils (%) (Auto) 6  0-10  %


 


Basophils (%) (Auto) 1  0-10  %


 


Neutrophils # (Auto) 2.9  1.8-7.8  X 10^3


 


Lymphocytes # (Auto) 2.7  1.0-4.0  X 10^3


 


Monocytes # (Auto) 1.1 H 0.0-1.0  X 10^3


 


Eosinophils # (Auto)


 0.4 H


 0.0-0.3


10^3/uL


 


Basophils # (Auto)


 0.1 


 0.0-0.1


10^3/uL


 


Prothrombin Time 14.7  12.2-14.7  SEC


 


INR Comment 1.1  0.8-1.4  


 


Activated Partial


Thromboplast Time 26 


 24-35  SEC





 


D-Dimer


 0.21 


 0.00-0.49


UG/ML


 


Sodium Level 131 L 135-145  MMOL/L


 


Potassium Level 4.7  3.6-5.0  MMOL/L


 


Chloride Level 99    MMOL/L


 


Carbon Dioxide Level 21  21-32  MMOL/L


 


Anion Gap 11  5-14  MMOL/L


 


Blood Urea Nitrogen 17  7-18  MG/DL


 


Creatinine


 0.70 


 0.60-1.30


MG/DL


 


Estimat Glomerular Filtration


Rate > 60 


  





 


BUN/Creatinine Ratio 24   


 


Glucose Level 97    MG/DL


 


Glucometer 99    MG/DL


 


Calcium Level 9.3  8.5-10.1  MG/DL


 


Corrected Calcium 9.2  8.5-10.1  MG/DL


 


Total Bilirubin 0.4  0.1-1.0  MG/DL


 


Aspartate Amino Transf


(AST/SGOT) 17 


 5-34  U/L





 


Alanine Aminotransferase


(ALT/SGPT) 20 


 0-55  U/L





 


Alkaline Phosphatase 66    U/L


 


Total Protein 6.4  6.4-8.2  GM/DL


 


Albumin 4.1  3.2-4.5  GM/DL








My Orders





Orders - SOHAM GRACE


Code/Resuscitation (19 18:01)


Cbc With Automated Diff (19 18:01)


Protime With Inr (19 18:)


Partial Thromboplastin Time (19 18:)


Comprehensive Metabolic Panel (19 18:)


Fibrin Degradation Products (19 18:)


Troponin I (19 18:)


Ua Culture If Indicated (19 18:)


Chest 1 View, Ap/Pa Only (19 18:)


Ekg Tracing (19 18:)


Nothing By Mouth (19 Breakfast)


Accucheck Stat ONCE (19 18:)


Saline Lock/Iv-Start (19 18:)


Saline Lock/Iv-Start (19 18:)


Vital Signs Stroke Patient Q15M (19 18:01)


Ct Head Wo-R/O Stroke (19 18:)


O2 (19 18:01)


Intake & Output 06,14,22 (19 18:01)


Monitor-Rhythm Ecg Trace Only (19 18:01)


Dysphagia Screening Tool (19 18:)


Lipid Panel (19 06:00)


Hydralazine Injection (Apresoline Inject (19 18:30)


Digoxin (19 18:17)





Medications Given in ED





Current Medications








 Medications  Dose


 Ordered  Sig/Hina


 Route  Start Time


 Stop Time Status Last Admin


Dose Admin


 


 Hydralazine HCl  10 mg  ONCE  ONCE


 IV  19 18:30


 19 18:31 DC 19 18:26


10 MG








Vital Signs/I&O











 19





 18:16


 


Temp 98.3


 


Pulse 68


 


Resp 18


 


B/P (MAP) 209/87 (127)











Progress


Progress Note :  


   Time:  18:12


Progress Note


Initial examination doesn't demonstrate hemorrhagic stroke, heart attack. 

Sounds like a TIA that has resolved before she got here. Could be hypertensive 

encephalopathy given her blood pressure was 209/87 upon arrival and about the 

same for EMS. She is on lisinopril and Eliquis with a heart rate of mid 60s so 

so using beta blockers may try hydralazine. Blood sugar 99 when she arrived in 

the ER.


Initial ECG Impression Date:  2019


Initial ECG Impression Time:  18:07


Initial ECG Rate:  57


Initial ECG Rhythm:  Normal Sinus


Initial ECG Intervals:  Normal


Initial ECG Impression:  Normal, Nonspecific Changes


Initial ECG Comparisson:  Unchanged


Comment


No ST-T segment elevation or depression.





Diagnostic Imaging





   Diagonstic Imaging:  CT (noncontrast)


   Plain Films/CT/US/NM/MRI:  head


Comments


No intracranial hemorrhage, mass effect, tumor, midline shift or calvarial 

fracture.


 [facility] [place] [place]





NAME:   ALECIA SINGHA L


MED REC#:   [id number]


ACCOUNT#:   [id number]


PT STATUS:   REG ER


:   1934


PHYSICIAN:   SOHAM GRACE MD


ADMIT DATE:   19/ER


 ***Draft***


Date of Exam:19





CT HEAD WO-R/O STROKE








PROCEDURE: CT head wo r/o stroke.





TECHNIQUE: Multiple contiguous axial images were obtained through


the brain without the use of intravenous contrast.





INDICATION: Stroke.. 





COMPARISON: None.





FINDINGS:  No CT evidence for territorial infarction.


Intracranial vascular calcifications. No intracranial hemorrhage,


mass effect, hydrocephalus or extra-axial fluid collections.


Moderate generalized parenchymal volume loss and leukoaraiosis.


No acute osseous findings. The visualized paranasal sinuses and


mastoids are clear.





IMPRESSION: No acute intracranial CT findings.





  Dictated on workstation # [id number]








Dict:   19 1828


Trans:   19 1834


[facility] [id number]





Interpreted by:     LEN BACA MD


Electronically signed by:


   Reviewed:  Reviewed by Me








   Diagonstic Imaging:  Xray


   Plain Films/CT/US/NM/MRI:  chest (1v)


Comments


Negative for acute cardiopulmonary processes noted.


 [facility] [place], [place]





NAME:   TERESSA SINGH L


MED REC#:   [id number]


ACCOUNT#:   [id number]


PT STATUS:   REG ER


:   1934


PHYSICIAN:   SOHAM GRACE MD


ADMIT DATE:   19/ER


 ***Draft***


Date of Exam:19





CHEST 1 VIEW, AP/PA ONLY








EXAM: CHEST 1 VIEW, AP/PA ONLY





INDICATION: Chest pain.





COMPARISON: Chest radiograph 2018.





FINDINGS: Normal heart size and central pulmonary vascularity. No


focal pulmonary opacity, pleural effusion or pneumothorax. No


acute osseous findings.





IMPRESSION: No acute cardiopulmonary findings.





  Dictated on workstation # [id number]








Dict:   19 1830


Trans:   19 1835


[facility] [id number]





Interpreted by:     LEN BACA MD


Electronically signed by:


   Reviewed:  Reviewed by Me


Consults :  


Consults Notes


Discussed the case with Dr. Butler, ANA CRISTINA stroke neurologist on-call and she 

agrees that the thoughts of this is more likely a hypertensive encephalopathy 

and we should pursue treatment of the hypertension with labetalol or 

hydralazine of the drug of her choice. She would not recommend further stroke 

workup and imaging at this time a stone and the fact that the patient's 

symptoms had resolved before she got here. She would just treat the pressure 

appropriately.





Departure


Communication (Admissions)


Time/Spoke to Admitting Phy:  18:45


Discussed the case lab imaging findings with Dr. Elias and she would like 

cardiac consult for management of blood pressure in the ICU.


Time/Spoke to Consulting Phy:  18:50


Discussed case lab imaging findings with Dr. Burroughs and he recommends Norvasc 10 

mg now and in the morning as well as Toprol- mg now and in the morning 

and then started nitroprusside drip in the ICU.





Impression





 Primary Impression:  


 Hypertensive encephalopathy


Disposition:  01 HOME, SELF-CARE (ERASED)


Condition:  Stable





Admissions


Decision to Admit Reason:  Admit from ER (General)


Decision to Admit/Date:  2019


Time/Decision to Admit Time:  18:59





Departure-Patient Inst.


Referrals:  


NARCISO BERKOWITZ DO (PCP/Family)


Primary Care Physician





Copy


Copies To 1:   NARCISO BERKOWITZ TITUS J 2019 18:08

## 2019-01-13 VITALS — SYSTOLIC BLOOD PRESSURE: 147 MMHG | DIASTOLIC BLOOD PRESSURE: 67 MMHG

## 2019-01-13 VITALS — SYSTOLIC BLOOD PRESSURE: 141 MMHG | DIASTOLIC BLOOD PRESSURE: 62 MMHG

## 2019-01-13 VITALS — SYSTOLIC BLOOD PRESSURE: 116 MMHG | DIASTOLIC BLOOD PRESSURE: 49 MMHG

## 2019-01-13 VITALS — DIASTOLIC BLOOD PRESSURE: 60 MMHG | SYSTOLIC BLOOD PRESSURE: 151 MMHG

## 2019-01-13 VITALS — SYSTOLIC BLOOD PRESSURE: 135 MMHG | DIASTOLIC BLOOD PRESSURE: 57 MMHG

## 2019-01-13 VITALS — SYSTOLIC BLOOD PRESSURE: 106 MMHG | DIASTOLIC BLOOD PRESSURE: 54 MMHG

## 2019-01-13 VITALS — SYSTOLIC BLOOD PRESSURE: 120 MMHG | DIASTOLIC BLOOD PRESSURE: 49 MMHG

## 2019-01-13 VITALS — SYSTOLIC BLOOD PRESSURE: 135 MMHG | DIASTOLIC BLOOD PRESSURE: 52 MMHG

## 2019-01-13 VITALS — DIASTOLIC BLOOD PRESSURE: 68 MMHG | SYSTOLIC BLOOD PRESSURE: 174 MMHG

## 2019-01-13 VITALS — DIASTOLIC BLOOD PRESSURE: 65 MMHG | SYSTOLIC BLOOD PRESSURE: 154 MMHG

## 2019-01-13 VITALS — DIASTOLIC BLOOD PRESSURE: 51 MMHG | SYSTOLIC BLOOD PRESSURE: 134 MMHG

## 2019-01-13 VITALS — SYSTOLIC BLOOD PRESSURE: 142 MMHG | DIASTOLIC BLOOD PRESSURE: 55 MMHG

## 2019-01-13 VITALS — SYSTOLIC BLOOD PRESSURE: 125 MMHG | DIASTOLIC BLOOD PRESSURE: 53 MMHG

## 2019-01-13 VITALS — SYSTOLIC BLOOD PRESSURE: 113 MMHG | DIASTOLIC BLOOD PRESSURE: 56 MMHG

## 2019-01-13 VITALS — SYSTOLIC BLOOD PRESSURE: 98 MMHG | DIASTOLIC BLOOD PRESSURE: 47 MMHG

## 2019-01-13 VITALS — SYSTOLIC BLOOD PRESSURE: 117 MMHG | DIASTOLIC BLOOD PRESSURE: 51 MMHG

## 2019-01-13 VITALS — DIASTOLIC BLOOD PRESSURE: 84 MMHG | SYSTOLIC BLOOD PRESSURE: 95 MMHG

## 2019-01-13 VITALS — DIASTOLIC BLOOD PRESSURE: 56 MMHG | SYSTOLIC BLOOD PRESSURE: 125 MMHG

## 2019-01-13 VITALS — DIASTOLIC BLOOD PRESSURE: 53 MMHG | SYSTOLIC BLOOD PRESSURE: 120 MMHG

## 2019-01-13 LAB
BASOPHILS # BLD AUTO: 0.1 10^3/UL (ref 0–0.1)
BASOPHILS NFR BLD AUTO: 1 % (ref 0–10)
BUN/CREAT SERPL: 20
CALCIUM SERPL-MCNC: 10 MG/DL (ref 8.5–10.1)
CHLORIDE SERPL-SCNC: 99 MMOL/L (ref 98–107)
CHOLEST SERPL-MCNC: 84 MG/DL (ref ?–200)
CO2 SERPL-SCNC: 21 MMOL/L (ref 21–32)
CREAT SERPL-MCNC: 0.7 MG/DL (ref 0.6–1.3)
EOSINOPHIL # BLD AUTO: 0.4 10^3/UL (ref 0–0.3)
EOSINOPHIL NFR BLD AUTO: 5 % (ref 0–10)
ERYTHROCYTE [DISTWIDTH] IN BLOOD BY AUTOMATED COUNT: 16.2 % (ref 10–14.5)
GFR SERPLBLD BASED ON 1.73 SQ M-ARVRAT: > 60 ML/MIN
GLUCOSE SERPL-MCNC: 107 MG/DL (ref 70–105)
HCT VFR BLD CALC: 36 % (ref 35–52)
HDLC SERPL-MCNC: 31 MG/DL (ref 40–60)
HGB BLD-MCNC: 11.7 G/DL (ref 11.5–16)
LYMPHOCYTES # BLD AUTO: 2.3 X 10^3 (ref 1–4)
LYMPHOCYTES NFR BLD AUTO: 27 % (ref 12–44)
MAGNESIUM SERPL-MCNC: 2.1 MG/DL (ref 1.8–2.4)
MANUAL DIFFERENTIAL PERFORMED BLD QL: NO
MCH RBC QN AUTO: 28 PG (ref 25–34)
MCHC RBC AUTO-ENTMCNC: 32 G/DL (ref 32–36)
MCV RBC AUTO: 86 FL (ref 80–99)
MONOCYTES # BLD AUTO: 1.1 X 10^3 (ref 0–1)
MONOCYTES NFR BLD AUTO: 14 % (ref 0–12)
NEUTROPHILS # BLD AUTO: 4.5 X 10^3 (ref 1.8–7.8)
NEUTROPHILS NFR BLD AUTO: 54 % (ref 42–75)
PHOSPHATE SERPL-MCNC: 3.9 MG/DL (ref 2.3–4.7)
PLATELET # BLD: 222 10^3/UL (ref 130–400)
PMV BLD AUTO: 11.6 FL (ref 7.4–10.4)
POTASSIUM SERPL-SCNC: 4.4 MMOL/L (ref 3.6–5)
RBC # BLD AUTO: 4.21 10^6/UL (ref 4.35–5.85)
SODIUM SERPL-SCNC: 132 MMOL/L (ref 135–145)
TRIGL SERPL-MCNC: 190 MG/DL (ref ?–150)
VLDLC SERPL CALC-MCNC: 38 MG/DL (ref 5–40)
WBC # BLD AUTO: 8.3 10^3/UL (ref 4.3–11)

## 2019-01-13 RX ADMIN — DIGOXIN SCH MG: 125 TABLET ORAL at 09:30

## 2019-01-13 RX ADMIN — Medication SCH MG: at 09:30

## 2019-01-13 RX ADMIN — DOXAZOSIN MESYLATE SCH MG: 2 TABLET ORAL at 21:15

## 2019-01-13 RX ADMIN — METOPROLOL SUCCINATE SCH MG: 100 TABLET, EXTENDED RELEASE ORAL at 09:30

## 2019-01-13 NOTE — PULMONARY CONSULTATION
History of Present Illness


History of Present Illness


Date of Consultation


1/13/19


 06:29


Time Seen by Provider:  06:29


Date of Admission





History of Present Illness


85yo with hx of HTN and multiple TIAs in the past presented to ED secondary to 

MS change and speech impairment. She was found to have HTN emergency and 

encephalopathy. Speech is now normal. Denies cp or palp or syncope or focal 

weakness. Cardiology is also consulted. She is currently on a cardene gtt.





Allergies and Home Medications


Allergies


Coded Allergies:  


     Penicillins (Verified  Allergy, Unknown, 8/14/17)


     Sulfa (Sulfonamide Antibiotics) (Verified  Allergy, Unknown, 8/14/17)


     latex (Verified  Allergy, Unknown, 8/14/17)


     meperidine (Verified  Allergy, Unknown, 8/14/17)





Home Medications


Furosemide 20 Mg Tablet, 20 MG PO DAILY


   Prescribed by: RASHMI PORRAS on 7/22/18 1812





Past Medical-Social-Family Hx


Patient Social History


Alcohol Use:  Denies Use


Recreational Drug Use:  No


Smoking Status:  Never a Smoker


Recent Foreign Travel:  No


Contact w/Someone Who Travel:  No


Recent Infectious Disease Expo:  No


Recent Hopitalizations:  No


Physical Abuse:  No


Sexual Abuse:  No


Mistreated:  No


Fear:  No





Immunizations Up To Date


Tetanus Booster (TDap):  Unknown


PED Vaccines UTD:  Yes


Date of Pneumonia Vaccine:  Oct 1, 2018


Date of Influenza Vaccine:  Oct 1, 2018





Past Medical History


Surgeries:  Yes (BOWEL RESECTION 05/2017; BSO)


Abdominal, Appendectomy, Bowel Surgery, Hysterectomy, Oophorectomy, Tubal 

Ligation


Respiratory:  No


Cardiac:  Yes


Atrial Fibrillation, Chronic Edema/Swelling, High Cholesterol, Hypertension


Neurological:  Yes


Neuropathy, TIA


GYN History:  Hysterectomy


Genitourinary:  No


Gastrointestinal:  Yes


Colitis, Gastroesophageal Reflux, Pancreatitis


Musculoskeletal:  No


Endocrine:  Yes


Diabetes, Non-Insulin dep


Are Your Blood Sugars Over 250:  No


HEENT:  No


Cancer:  No


Psychosocial:  No


Integumentary:  No


Blood Disorders:  No


Adverse Reaction/Blood Tranf:  No





Family Medical History


No Pertinent Family Hx





Sepsis Event


Evaluation


Height, Weight, BMI


Height: 5'4.00"


Weight: 104lbs. 0.5oz. 47.637864yu; 17.9 BMI


Method:Estimated





Exam


Exam





Vital Signs








  Date Time  Temp Pulse Resp B/P (MAP) Pulse Ox O2 Delivery O2 Flow Rate FiO2


 


1/13/19 06:00  57 18 154/65 (94) 96 Room Air  


 


1/13/19 05:00  53 13 151/60 (90) 96 Room Air  


 


1/13/19 04:00  54 13 135/57 (83) 97 Room Air  


 


1/13/19 04:00     97 Room Air  


 


1/13/19 03:00  53 13 142/55 (84) 97 Room Air  


 


1/13/19 02:00  55 16 125/53 (77) 97 Room Air  


 


1/13/19 01:00  60      


 


1/13/19 01:00  55 14 125/56 (79) 96 Room Air  


 


1/13/19 00:00     97 Room Air  


 


1/13/19 00:00  52 13 135/52 (79) 96 Room Air  


 


1/12/19 23:00  53 14 135/58 (83) 98 Room Air  


 


1/12/19 22:00  56 17 135/57 (83) 98 Room Air  


 


1/12/19 21:45  55 14 140/53 (82) 98 Room Air  


 


1/12/19 21:30  77      


 


1/12/19 21:15  60 16 169/74 (105) 98 Room Air  


 


1/12/19 21:00  58 14 179/73 (108) 98 Room Air  


 


1/12/19 20:45  68 18 193/71 (111) 99 Room Air  


 


1/12/19 20:35  66 16 175/75 (108) 99   


 


1/12/19 20:32  70      


 


1/12/19 20:30 96.7 68 18 193/71 (111) 99 Room Air  


 


1/12/19 20:20     97 Room Air  


 


1/12/19 20:13  70 16 175/78 (110) 98   


 


1/12/19 18:16 98.3 68 18 209/87 (127)    














I & O 


 


 1/13/19





 07:00


 


Intake Total 200 ml


 


Output Total 500 ml


 


Balance -300 ml








Height & Weight


Height: 5'4.00"


Weight: 104lbs. 0.5oz. 47.442251dm; 17.9 BMI


Method:Estimated


Capillary Refill:  Less Than 3 Seconds


Peripheral Pulses:  2+ Dorsalis Pedis (R), 2+ Left Dors-Pedis (L), 2+ Radial 

Pulses (R), 2+ Radial Pulses (L)


Gastrointestinal:  non tender, soft





Results


Lab


Laboratory Tests


1/12/19 18:12








1/13/19 03:20











Assessment/Plan


Assessment/Plan


TIA- now resolved 


HTN emergency 


   -Currently on nitroprusside gtt - continue to titrate


   -currently on PO Lopressor, norvasc, clonidine , lisinopril 


Paroxysmal Afib - currently sinus











JUDY LOYA DO Jan 13, 2019 06:34

## 2019-01-13 NOTE — NUR
1700 REPORT GIVEN TO DEANNA AVILEZ.  PATIENT TRANSFERRED TO [facility], PERSONAL BELONGINGS IN 
HAND.

## 2019-01-13 NOTE — PROGRESS NOTE-CARDIOLOGY
Cardiology SOAP Progress Note


Subjective:


No cp or palp or syncope or shortness of breath or focal weakness or speech 

impairment





Objective:


I&O/Vital Signs











 1/12/19 1/13/19 1/13/19 1/13/19





 23:00 00:00 00:00 00:00


 


Temp  96.8  


 


Pulse 53  52 


 


Resp 14  13 


 


B/P (MAP) 135/58 (83)  135/52 (79) 


 


Pulse Ox 98  96 97


 


O2 Delivery Room Air  Room Air Room Air


 


    





 1/13/19 1/13/19 1/13/19 1/13/19





 01:00 01:00 02:00 03:00


 


Pulse 55 60 55 53


 


Resp 14  16 13


 


B/P (MAP) 125/56 (79)  125/53 (77) 142/55 (84)


 


Pulse Ox 96  97 97


 


O2 Delivery Room Air  Room Air Room Air





 1/13/19 1/13/19 1/13/19 1/13/19





 04:00 04:00 04:00 05:00


 


Temp  96.9  


 


Pulse   54 53


 


Resp   13 13


 


B/P (MAP)   135/57 (83) 151/60 (90)


 


Pulse Ox 97  97 96


 


O2 Delivery Room Air  Room Air Room Air


 


    





 1/13/19 1/13/19 1/13/19 1/13/19





 06:00 07:00 07:22 08:00


 


Pulse 57 58 61 


 


Resp 18 16  


 


B/P (MAP) 154/65 (94) 174/68 (103)  


 


Pulse Ox 96 96  97


 


O2 Delivery Room Air Room Air  Room Air





 1/13/19   





 08:00   


 


Pulse 66   


 


Resp 30   


 


B/P (MAP)    


 


O2 Delivery Room Air   














 1/13/19





 00:00


 


Intake Total 100 ml


 


Output Total 300 ml


 


Balance -200 ml








Weight (Pounds):  104


Weight (Ounces):  0.5


Weight (Calculated Kilograms):  47.951883


Constitutional:  AAO x 3, well-developed, well-nourished


Respiratory:  No accessory muscle use; lungs clear to auscultation


Cardiovascular:  regular rate-rhythm, S1 and S2, systolic murmur (soft ROMY at 

card base)


Gastrointestional:  No tender; soft; No guarding, No rebound; audible bowel 

sounds


Extremities:  No clubbing, No cyanosis, No significant edema


Neurologic/Psychiatric:  oriented x 3, grossly intact, power is 5/5 both on 

sides


Skin:  No rash on exposed areas, No ulcerations on exposed areas





Results/Procedures:


Labs


Laboratory Tests


1/12/19 18:12: 


White Blood Count 7.2, Red Blood Count 3.85L, Hemoglobin 11.0L, Hematocrit 33L, 

Mean Corpuscular Volume 86, Mean Corpuscular Hemoglobin 29, Mean Corpuscular 

Hemoglobin Concent 33, Red Cell Distribution Width 16.2H, Platelet Count 223, 

Mean Platelet Volume 11.3H, Neutrophils (%) (Auto) 41L, Lymphocytes (%) (Auto) 

38, Monocytes (%) (Auto) 15H, Eosinophils (%) (Auto) 6, Basophils (%) (Auto) 1, 

Neutrophils # (Auto) 2.9, Lymphocytes # (Auto) 2.7, Monocytes # (Auto) 1.1H, 

Eosinophils # (Auto) 0.4H, Basophils # (Auto) 0.1, Prothrombin Time 14.7, INR 

Comment 1.1, Activated Partial Thromboplast Time 26, D-Dimer 0.21, Sodium Level 

131L, Potassium Level 4.7, Chloride Level 99, Carbon Dioxide Level 21, Anion 

Gap 11, Blood Urea Nitrogen 17, Creatinine 0.70, Estimat Glomerular Filtration 

Rate > 60, BUN/Creatinine Ratio 24, Glucose Level 97, Glucometer 99, Calcium 

Level 9.3, Corrected Calcium 9.2, Total Bilirubin 0.4, Aspartate Amino Transf (

AST/SGOT) 17, Alanine Aminotransferase (ALT/SGPT) 20, Alkaline Phosphatase 66, 

Troponin I < 0.028, Total Protein 6.4, Albumin 4.1, Digoxin Level 0.78L


1/12/19 19:03: 


Urine Color YELLOW, Urine Clarity CLEAR, Urine pH 6.5, Urine Specific Gravity 

1.010L, Urine Protein NEGATIVE, Urine Glucose (UA) NEGATIVE, Urine Ketones 

NEGATIVE, Urine Nitrite NEGATIVE, Urine Bilirubin NEGATIVE, Urine Urobilinogen 

NORMAL, Urine Leukocyte Esterase 1+H, Urine RBC (Auto) NEGATIVE, Urine RBC NONE

, Urine WBC 0-2, Urine Squamous Epithelial Cells 0-2, Urine Crystals NONE, 

Urine Bacteria NEGATIVE, Urine Casts NONE, Urine Mucus NEGATIVE, Urine Culture 

Indicated NO


1/13/19 03:20: 


White Blood Count 8.3, Red Blood Count 4.21L, Hemoglobin 11.7, Hematocrit 36, 

Mean Corpuscular Volume 86, Mean Corpuscular Hemoglobin 28, Mean Corpuscular 

Hemoglobin Concent 32, Red Cell Distribution Width 16.2H, Platelet Count 222, 

Mean Platelet Volume 11.6H, Neutrophils (%) (Auto) 54, Lymphocytes (%) (Auto) 27

, Monocytes (%) (Auto) 14H, Eosinophils (%) (Auto) 5, Basophils (%) (Auto) 1, 

Neutrophils # (Auto) 4.5, Lymphocytes # (Auto) 2.3, Monocytes # (Auto) 1.1H, 

Eosinophils # (Auto) 0.4H, Basophils # (Auto) 0.1, Sodium Level 132L, Potassium 

Level 4.4, Chloride Level 99, Carbon Dioxide Level 21, Anion Gap 12, Blood Urea 

Nitrogen 14, Creatinine 0.70, Estimat Glomerular Filtration Rate > 60, BUN/

Creatinine Ratio 20, Glucose Level 107H, Calcium Level 10.0, Phosphorus Level 

3.9, Magnesium Level 2.1, Triglycerides Level 190H, Cholesterol Level 84, LDL 

Cholesterol Direct 29, VLDL Cholesterol 38, HDL Cholesterol 31L





Laboratory Tests


1/12/19 18:12








1/13/19 03:20











A/P:


Assessment:





Transient speech impairment. Her hospitalist, Dr Elias, has determined that 

this not an acute CVA requiring intervention





Severe hypertension with suspected hypertensive encephalopathy





Paroxysmal atrial fibrillation, currently in sinus rhythm, maintained on 

digoxin and Eliquis by Dr Hutton





History of cardiac catheterization in July 2015 in [facility]: reported 

as mild coronary artery disease with tortuous coronaries; no significant 

obstructive disease





Hyperlipidemia treated with statins that are managed by Dr Hutton





Diabetes mellitus, followed and managed by primary care physician





1-39% bilat carotid stenoses, per u/s at Dr Hutton's in Aug 2018


Plan:





* Treat hypertension amlodipine and metoprolol succinate and doxazosin


* Continue dig for vent rate control during A Fib


* Continue apixaban for stroke prophylaxis


* Monitor labs





Clinical Quality Measures


Stroke:


Date of last known well:  Jan 12, 2019


Time of last known well:  17:15


Symptoms onset unknown:  DEONDRE Nath MD FACP FAC CCDS Jan 13, 2019 10:51

## 2019-01-13 NOTE — DIAGNOSTIC IMAGING REPORT
EXAMINATION: Portable erect AP chest obtained at  2:30.



INDICATION: Hypertension



The heart size is within normal limits and stable when compared

to 01/12/2019. The lungs remain clear. There is still no sign of

failure, pneumonia or a pleural effusion. The mediastinum is not

widened. The osseous structures are intact.



IMPRESSION:

Stable chest. There has been no adverse change since the prior

exam. 



Dictated by: 



  Dictated on workstation # [id number]

## 2019-01-13 NOTE — HISTORY & PHYSICAL-HOSPITALIST
History of Present Illness


HPI/Chief Complaint


The patient is an 84-year-old white female who was admitted after she presented 

to the emergency room last evening.  She presented with strokelike symptoms.  

She reports that she has had multiple TIAs going back as long as 10 years ago.  

She believes that there have been 4 total.  She is also had atrial fibrillation 

for several years.  In addition she has had hypertension.  After workup in the 

emergency room and discussion with [facility] stroke team it was 

concluded that she was not a candidate for TPA as she was improving rapidly.  

She reports that in the past she had taken calcium channel blockers and does 

seem to cause her feet to swell.  She feels that the the sensation last evening 

was most about the mouth and specifically the left side of the mouth.  This has 

now resolved completely.  She takes Eliquis 2.5 mg twice a day because of 

atrial fibrillation.


Date Seen


19


Time Seen by a Provider:  15:37


Attending Physician


Geraldine Elias DO


PCP


Fernando Deshpande DO


Referring Physician





Date of Admission


2019 at 18:45





Home Medications & Allergies


Home Medications


Reviewed patient Home Medication Reconciliation performed by pharmacy 

medication reconciliations technician and/or nursing.


Patients Allergies have been reviewed.





Allergies





Allergies


Coded Allergies


  Penicillins (Verified Allergy, Unknown, 17)


  Sulfa (Sulfonamide Antibiotics) (Verified Allergy, Unknown, 17)


  latex (Verified Allergy, Unknown, 17)


  meperidine (Verified Allergy, Unknown, 17)








Past Medical-Social-Family Hx


Past Med/Social Hx:  Reviewed Nursing Past Med/Soc Hx


Patient Social History


Alcohol Use:  Denies Use


Recreational Drug Use:  No


Smoking Status:  Never a Smoker


Physical Abuse Screen:  No


Sexual Abuse:  No


Recent Foreign Travel:  No


Contact w/other who traveled:  No


Recent Hopitalizations:  No


Recent Infectious Disease Expo:  No





Immunizations Up To Date


Tetanus Booster (TDap):  Unknown


Pediatric:  Yes


Date of Pneumonia Vaccine:  Oct 1, 2018


Date of Influenza Vaccine:  Oct 1, 2018





Past Medical History


Surgeries:  Abdominal, Appendectomy, Bowel Surgery, Hysterectomy, Oophorectomy, 

Tubal Ligation


Cardiac:  Atrial Fibrillation, Chronic Edema/Swelling, High Cholesterol, 

Hypertension


Neurological:  Neuropathy, TIA


Hysterectomy


Gastrointestinal:  Colitis, Gastroesophageal Reflux, Pancreatitis


Endocrine:  Diabetes, Non-Insulin dep


Are Your Blood Sugars Over 250:  No


History of Blood Disorders:  No


Adverse Reaction to Blood Alvarado:  No





Family History


No Pertinent Family Hx





Review of Systems


Constitutional:  see HPI


EENTM:  see HPI


Respiratory:  no symptoms reported


Cardiovascular:  palpitations


Gastrointestinal:  no symptoms reported


Genitourinary:  no symptoms reported


Musculoskeletal:  no symptoms reported


Skin:  no symptoms reported


Psychiatric/Neurological:  No Symptoms Reported





Physical Exam


Physical Exam


Vital Signs





Vital Signs - First Documented








 19





 18:16 20:13 20:20


 


Temp 98.3  


 


Pulse 68  


 


Resp 18  


 


B/P (MAP) 209/87 (127)  


 


Pulse Ox  98 


 


O2 Delivery   Room Air





Capillary Refill : Less Than 3 Seconds


Height, Weight, BMI


Height: 5'4.00"


Weight: 104lbs. 0.5oz. [id number]; 17.9 BMI


Method:Estimated


General Appearance:  No Apparent Distress, WD/WN


Eyes:  Bilateral Eye Normal Inspection


HEENT:  Normal ENT Inspection


Neck:  Full Range of Motion, Normal Inspection, Non Tender, Supple, Carotid 

Bruit


Respiratory:  Chest Non Tender, Lungs Clear, Normal Breath Sounds, No Accessory 

Muscle Use, No Respiratory Distress


Cardiovascular:  Other


Gastrointestinal:  Normal Bowel Sounds, No Organomegaly, No Pulsatile Mass, Non 

Tender, Soft


Back:  Normal Inspection


Extremity:  Other (there was erythema at the ankles bilaterally without 

swelling.  This appeared consistent with mild venous stasis dermatitis.)


Neurologic/Psychiatric:  Alert, Oriented x3, No Motor/Sensory Deficits


Comments


Cranial nerves II through XII were grossly intact.  There appeared to be minor 

effacement of the nasolabial fold on the right, the patient reported that 

symptoms had been on the left.  Speech was normal.  Extremities showed the  

to be 2+ and equal bilaterally.  She was able to straight leg lift bilaterally.





Results


Results/Procedures


Labs


Laboratory Tests


19 18:12








19 03:20








Patient resulted labs reviewed.





Assessment/Plan


Admission Diagnosis


TIA with left-sided symptoms now resolved.  2.hypertension.  3.atrial 

fibrillation.


Admission Status:  Observation





Clinical Quality Measures


DVT/VTE Risk/Contraindication:


Risk Factor Score Per Nursin


RFS Level Per Nursing on Admit:  2=Moderate





Stroke:


Date of last known well:  2019


Time of last known well:  17:15


Symptoms onset unknown:  No











ABBY HERBERT MD 2019 15:42

## 2019-01-14 VITALS — DIASTOLIC BLOOD PRESSURE: 65 MMHG | SYSTOLIC BLOOD PRESSURE: 132 MMHG

## 2019-01-14 VITALS — DIASTOLIC BLOOD PRESSURE: 65 MMHG | SYSTOLIC BLOOD PRESSURE: 144 MMHG

## 2019-01-14 RX ADMIN — DOXAZOSIN MESYLATE SCH MG: 2 TABLET ORAL at 08:40

## 2019-01-14 RX ADMIN — METOPROLOL SUCCINATE SCH MG: 100 TABLET, EXTENDED RELEASE ORAL at 08:40

## 2019-01-14 RX ADMIN — Medication SCH MG: at 08:40

## 2019-01-14 RX ADMIN — DIGOXIN SCH MG: 125 TABLET ORAL at 08:40

## 2019-01-14 NOTE — PROGRESS NOTE (SOAP)
Subjective


Time Seen by a Provider:  07:47


Subjective/Events-last exam


Patient states she's feeling better.


Patient's blood pressure last night was good and good this morning.


Patient would like to go home today.





Objective


Exam





Vital Signs








  Date Time  Temp Pulse Resp B/P (MAP) Pulse Ox O2 Delivery O2 Flow Rate FiO2


 


19 04:02 98.4 60 16 132/65 (87) 96 Room Air  


 


19 23:58 98.9 61 20 106/54 (71) 97 Room Air  


 


19 21:00      Room Air  


 


19 20:00 98.1 65 22 120/53 (75) 97 Room Air  


 


19 17:00 98.6 65 16 113/56 (75) 99 Room Air  


 


19 16:00  60 35 98/47 (64) 98 Room Air  


 


19 15:00  51 11 120/49 (72) 97 Room Air  


 


19 14:00  53 12 117/51 (73) 97 Room Air  


 


19 13:20  55      


 


19 13:00  55 23 116/49 (71) 96 Room Air  


 


19 12:00     97 Room Air  


 


19 12:00  55 24 141/62 (88) 97 Room Air  


 


19 11:00  57 16 134/51 (78) 96 Room Air  


 


19 10:00  61 30 147/67 (93) 96 Room Air  


 


19 09:00  65 21 95/84 (88) 96 Room Air  


 


19 08:00  66 30   Room Air  


 


19 08:00     97 Room Air  














I & O 


 


 19





 07:00


 


Intake Total 1000 ml


 


Output Total 200 ml


 


Balance 800 ml





Capillary Refill : Less Than 3 Seconds


General Appearance:  No Apparent Distress, Thin


HEENT:  Normal ENT Inspection


Neck:  Normal Inspection


Respiratory:  Lungs Clear, No Accessory Muscle Use, No Respiratory Distress


Cardiovascular:  Regular Rate, Rhythm


Gastrointestinal:  non tender, soft





Assessment/Plan


Assessment/Plan


Assess & Plan/Chief Complaint


Malignant hypertension.


TIA.





Clinical Quality Measures


DVT/VTE Risk/Contraindication:


Risk Factor Score Per Nursin


RFS Level Per Nursing on Admit:  2=Moderate





Stroke:


Date of last known well:  2019


Time of last known well:  17:15


Symptoms onset unknown:  No











GELLENDER,NARCISO A DO 2019 07:50

## 2019-01-14 NOTE — CARDIOLOGY PROGRESS NOTE
Subjective


Date Seen by Provider:  2019


Time Seen by Provider:  10:01


Subjective/Events-last exam


patient was seen at bedside, sitting in a chair, asking to go home, denied any 

chest pain, no slurred speech, reporting for recovery.


Review of Systems


General:  No Chills, No Night Sweats, No Fatigue, No Malaise, No Appetite, No 

Other


HEENT:  No Head Aches, No Visual Changes, No Eye Pain, No Ear Pain, No Dysphasia

, No Sinus Congestion, No Post Nasal Drip, No Sore Throat, No Other


Pulmonary:  No Dyspnea, No Cough, No Pleuritic Chest Pain, No Other


Cardiovascular:  No: Chest Pain, Palpitations, Orthopnea, Paroxysmal Noc. 

Dyspnea, Edema, Lt Headedness, Other





Objective-Cardiology


Exam


Last Set of Vital Signs





Vital Signs








 19





 08:00


 


Temp 98.6


 


Pulse 59


 


Resp 20


 


B/P (MAP) 144/65 (91)


 


Pulse Ox 96


 


O2 Delivery Room Air





Capillary Refill : Less Than 3 Seconds


I&O











Intake and Output 


 


 19





 00:00


 


Intake Total 1200 ml


 


Output Total 700 ml


 


Balance 500 ml


 


 


 


Intake Oral 1200 ml


 


Output Urine Total 700 ml


 


# Voids 4


 


# Bowel Movements 1








General:  Alert, Oriented X3, Cooperative


HEENT:  Atraumatic, PERRLA


Neck:  Supple, No JVD, No Thyromegaly


Lungs:  Clear to Auscultation, Normal Air Movement


Heart:  Regular Rate, Normal S1, Normal S2, No Murmurs


Abdomen:  Normal Bowel Sounds, Soft, No Tenderness, No Hepatosplenomegaly, No 

Masses


Extremities:  No Clubbing, No Cyanosis, No Edema, Normal Pulses, No Tenderness/

Swelling


Skin:  No Rashes, No Breakdown, No Significant Lesion


Neuro:  Normal Gait, Normal Speech, Strength at 5/5 X4 Ext, Normal Tone, 

Sensation Intact


Psych/Mental Status:  Mental Status NL, Mood NL





A/P-Cardiology


Admission Diagnosis


TIA


Paroxysmal atrial fibrillation


Malignant hypertension


Hyperlipidemia





Assessment/Plan


Transient speech impairment, questionable TIA versus hypertensive encephalopathy

, has been maintained on Eliquis which will be restarted, blood pressure is 

better controlled.  Okay for discharge from cardiology standpoint





Severe hypertension with suspected hypertensive encephalopathy, blood pressure 

is better controlled.  Continue to monitor





Paroxysmal atrial fibrillation, currently in sinus rhythm, maintained on 

digoxin and Eliquis, continue to monitor





History of cardiac catheterization in 2015 in [facility]: reported 

as mild coronary artery disease with tortuous coronaries; no significant 

obstructive disease





Hyperlipidemia treated with statins, continue to follow





Diabetes mellitus, followed and managed by primary care physician





Mild bilateral carotid stenosis, last ultrasound was done in 2018.  

Continue to monitor





Okay for discharge from cardiology standpoint and follow up in 2 weeks.





Clinical Quality Measures


DVT/VTE Risk/Contraindication:


Risk Factor Score Per Nursin


RFS Level Per Nursing on Admit:  2=Moderate





Stroke:


Date of last known well:  2019


Time of last known well:  17:15


Symptoms onset unknown:  No











JIN LINDSEY MD 2019 10:03

## 2019-01-15 NOTE — DISCHARGE SUMMARY
Diagnosis/Chief Complaint


Date of Admission


2019 at 18:45


Date of Discharge


2019 at 11:30


Discharge Time:  07:28


Discharge Diagnosis


Transient ischemic attack.


Paroxymal atrial fibrillation.


Malignant hypertension.


Hyperlipidemia





Discharge Summary


Consultations


Cardiology consult


Discharge Physical Examination


Allergies:  


Coded Allergies:  


     Penicillins (Verified  Allergy, Unknown, 17)


     Sulfa (Sulfonamide Antibiotics) (Verified  Allergy, Unknown, 17)


     latex (Verified  Allergy, Unknown, 17)


     meperidine (Verified  Allergy, Unknown, 17)


Vitals & I&Os





Vital Signs








  Date Time  Temp Pulse Resp B/P (MAP) Pulse Ox O2 Delivery O2 Flow Rate FiO2


 


19 11:30        


 


19 08:00      Room Air  


 


19 08:00 98.6 59 20  96   











Hospital Course


Blood pressure under control.


Patient felt much better.


Patient discharged to home to be followed up in the office


Labs (last 24 hrs)


Laboratory Tests


19 18:12: 


White Blood Count 7.2, Red Blood Count 3.85L, Hemoglobin 11.0L, Hematocrit 33L, 

Mean Corpuscular Volume 86, Mean Corpuscular Hemoglobin 29, Mean Corpuscular 

Hemoglobin Concent 33, Red Cell Distribution Width 16.2H, Platelet Count 223, 

Mean Platelet Volume 11.3H, Neutrophils (%) (Auto) 41L, Lymphocytes (%) (Auto) 

38, Monocytes (%) (Auto) 15H, Eosinophils (%) (Auto) 6, Basophils (%) (Auto) 1, 

Neutrophils # (Auto) 2.9, Lymphocytes # (Auto) 2.7, Monocytes # (Auto) 1.1H, 

Eosinophils # (Auto) 0.4H, Basophils # (Auto) 0.1, Prothrombin Time 14.7, INR 

Comment 1.1, Activated Partial Thromboplast Time 26, D-Dimer 0.21, Sodium Level 

131L, Potassium Level 4.7, Chloride Level 99, Carbon Dioxide Level 21, Anion 

Gap 11, Blood Urea Nitrogen 17, Creatinine 0.70, Estimat Glomerular Filtration 

Rate > 60, BUN/Creatinine Ratio 24, Glucose Level 97, Glucometer 99, Calcium 

Level 9.3, Corrected Calcium 9.2, Total Bilirubin 0.4, Aspartate Amino Transf (

AST/SGOT) 17, Alanine Aminotransferase (ALT/SGPT) 20, Alkaline Phosphatase 66, 

Troponin I < 0.028, Total Protein 6.4, Albumin 4.1, Digoxin Level 0.78L


19 19:03: 


Urine Color YELLOW, Urine Clarity CLEAR, Urine pH 6.5, Urine Specific Gravity 

1.010L, Urine Protein NEGATIVE, Urine Glucose (UA) NEGATIVE, Urine Ketones 

NEGATIVE, Urine Nitrite NEGATIVE, Urine Bilirubin NEGATIVE, Urine Urobilinogen 

NORMAL, Urine Leukocyte Esterase 1+H, Urine RBC (Auto) NEGATIVE, Urine RBC NONE

, Urine WBC 0-2, Urine Squamous Epithelial Cells 0-2, Urine Crystals NONE, 

Urine Bacteria NEGATIVE, Urine Casts NONE, Urine Mucus NEGATIVE, Urine Culture 

Indicated NO


19 03:20: 


White Blood Count 8.3, Red Blood Count 4.21L, Hemoglobin 11.7, Hematocrit 36, 

Mean Corpuscular Volume 86, Mean Corpuscular Hemoglobin 28, Mean Corpuscular 

Hemoglobin Concent 32, Red Cell Distribution Width 16.2H, Platelet Count 222, 

Mean Platelet Volume 11.6H, Neutrophils (%) (Auto) 54, Lymphocytes (%) (Auto) 27

, Monocytes (%) (Auto) 14H, Eosinophils (%) (Auto) 5, Basophils (%) (Auto) 1, 

Neutrophils # (Auto) 4.5, Lymphocytes # (Auto) 2.3, Monocytes # (Auto) 1.1H, 

Eosinophils # (Auto) 0.4H, Basophils # (Auto) 0.1, Sodium Level 132L, Potassium 

Level 4.4, Chloride Level 99, Carbon Dioxide Level 21, Anion Gap 12, Blood Urea 

Nitrogen 14, Creatinine 0.70, Estimat Glomerular Filtration Rate > 60, BUN/

Creatinine Ratio 20, Glucose Level 107H, Calcium Level 10.0, Phosphorus Level 

3.9, Magnesium Level 2.1, Triglycerides Level 190H, Cholesterol Level 84, LDL 

Cholesterol Direct 29, VLDL Cholesterol 38, HDL Cholesterol 31L





Microbiology


19 MRSA Screen - Final, Complete


          MRSA not isolated





Laboratory Tests


19 18:12








19 03:20








Pending Labs





Microbiology








 Date/Time


Source Procedure


Growth Status





 


 19 20:31


Nasal MRSA Screen - Final


MRSA not isolated Complete





Laboratory Tests


19 18:12: 


White Blood Count 7.2, Red Blood Count 3.85, Hemoglobin 11.0, Hematocrit 33, 

Mean Corpuscular Volume 86, Mean Corpuscular Hemoglobin 29, Mean Corpuscular 

Hemoglobin Concent 33, Red Cell Distribution Width 16.2, Platelet Count 223, 

Mean Platelet Volume 11.3, Neutrophils (%) (Auto) 41, Lymphocytes (%) (Auto) 38

, Monocytes (%) (Auto) 15, Eosinophils (%) (Auto) 6, Basophils (%) (Auto) 1, 

Neutrophils # (Auto) 2.9, Lymphocytes # (Auto) 2.7, Monocytes # (Auto) 1.1, 

Eosinophils # (Auto) 0.4, Basophils # (Auto) 0.1, Prothrombin Time 14.7, INR 

Comment 1.1, Activated Partial Thromboplast Time 26, D-Dimer 0.21, Sodium Level 

131, Potassium Level 4.7, Chloride Level 99, Carbon Dioxide Level 21, Anion Gap 

11, Blood Urea Nitrogen 17, Creatinine 0.70, Estimat Glomerular Filtration Rate 

> 60, BUN/Creatinine Ratio 24, Glucose Level 97, Glucometer 99, Calcium Level 

9.3, Corrected Calcium 9.2, Total Bilirubin 0.4, Aspartate Amino Transf (AST/

SGOT) 17, Alanine Aminotransferase (ALT/SGPT) 20, Alkaline Phosphatase 66, 

Troponin I < 0.028, Total Protein 6.4, Albumin 4.1, Digoxin Level 0.78


19 19:03: 


Urine Color YELLOW, Urine Clarity CLEAR, Urine pH 6.5, Urine Specific Gravity 

1.010, Urine Protein NEGATIVE, Urine Glucose (UA) NEGATIVE, Urine Ketones 

NEGATIVE, Urine Nitrite NEGATIVE, Urine Bilirubin NEGATIVE, Urine Urobilinogen 

NORMAL, Urine Leukocyte Esterase 1+, Urine RBC (Auto) NEGATIVE, Urine RBC NONE, 

Urine WBC 0-2, Urine Squamous Epithelial Cells 0-2, Urine Crystals NONE, Urine 

Bacteria NEGATIVE, Urine Casts NONE, Urine Mucus NEGATIVE, Urine Culture 

Indicated NO


19 03:20: 


White Blood Count 8.3, Red Blood Count 4.21, Hemoglobin 11.7, Hematocrit 36, 

Mean Corpuscular Volume 86, Mean Corpuscular Hemoglobin 28, Mean Corpuscular 

Hemoglobin Concent 32, Red Cell Distribution Width 16.2, Platelet Count 222, 

Mean Platelet Volume 11.6, Neutrophils (%) (Auto) 54, Lymphocytes (%) (Auto) 27

, Monocytes (%) (Auto) 14, Eosinophils (%) (Auto) 5, Basophils (%) (Auto) 1, 

Neutrophils # (Auto) 4.5, Lymphocytes # (Auto) 2.3, Monocytes # (Auto) 1.1, 

Eosinophils # (Auto) 0.4, Basophils # (Auto) 0.1, Sodium Level 132, Potassium 

Level 4.4, Chloride Level 99, Carbon Dioxide Level 21, Anion Gap 12, Blood Urea 

Nitrogen 14, Creatinine 0.70, Estimat Glomerular Filtration Rate > 60, BUN/

Creatinine Ratio 20, Glucose Level 107, Calcium Level 10.0, Phosphorus Level 3.9

, Magnesium Level 2.1, Triglycerides Level 190, Cholesterol Level 84, LDL 

Cholesterol Direct 29, VLDL Cholesterol 38, HDL Cholesterol 31





Discharge


Home Medications:





Active Scripts


Active


Metoprolol Succinate 100 Mg Tab.er.24h 100 Mg PO DAILY


Doxazosin Mesylate 2 Mg Tablet 2 Mg PO BID


Lasix (Furosemide) 20 Mg Tablet 20 Mg PO DAILY 3 Days


Reported


Omeprazole 20 Mg Capsule.dr 20 Mg PO 


Nifedipine ER (Nifedipine) 60 Mg Tablet.er   


Isosorbide Mononitrate ER (Isosorbide Mononitrate) 30 Mg Tab.er.24h   


Gabapentin 100 Mg Capsule   


Allopurinol 100 Mg Tablet   


Digox (Digoxin) 125 Mcg Tablet   


Eliquis (Apixaban) 2.5 Mg Tablet   


Budesonide EC (Budesonide) 3 Mg Capdr...er   


Metformin HCl 500 Mg Tablet   


Acarbose 100 Mg Tablet   


Acyclovir 800 Mg Tablet   





Instructions to patient/family


Please see electronic discharge instructions given to patient.





Clinical Quality Measures


DVT/VTE Risk/Contraindication:


Risk Factor Score Per Nursin


RFS Level Per Nursing on Admit:  2=Moderate





Stroke:


Date of last known well:  2019


Time of last known well:  17:15


Symptoms onset unknown:  No











NARCISO BERKOWITZ DO Ramses 15, 2019 07:31

## 2019-02-04 ENCOUNTER — HOSPITAL ENCOUNTER (INPATIENT)
Dept: HOSPITAL 75 - ER | Age: 84
LOS: 2 days | Discharge: HOME | DRG: 441 | End: 2019-02-06
Attending: SURGERY | Admitting: SURGERY
Payer: MEDICARE

## 2019-02-04 VITALS — SYSTOLIC BLOOD PRESSURE: 189 MMHG | DIASTOLIC BLOOD PRESSURE: 70 MMHG

## 2019-02-04 VITALS — SYSTOLIC BLOOD PRESSURE: 167 MMHG | DIASTOLIC BLOOD PRESSURE: 61 MMHG

## 2019-02-04 VITALS — SYSTOLIC BLOOD PRESSURE: 186 MMHG | DIASTOLIC BLOOD PRESSURE: 77 MMHG

## 2019-02-04 VITALS — DIASTOLIC BLOOD PRESSURE: 60 MMHG | SYSTOLIC BLOOD PRESSURE: 164 MMHG

## 2019-02-04 VITALS — HEIGHT: 64 IN | BODY MASS INDEX: 19.49 KG/M2 | WEIGHT: 114.13 LBS

## 2019-02-04 VITALS — SYSTOLIC BLOOD PRESSURE: 134 MMHG | DIASTOLIC BLOOD PRESSURE: 52 MMHG

## 2019-02-04 VITALS — DIASTOLIC BLOOD PRESSURE: 44 MMHG | SYSTOLIC BLOOD PRESSURE: 121 MMHG

## 2019-02-04 VITALS — DIASTOLIC BLOOD PRESSURE: 72 MMHG | SYSTOLIC BLOOD PRESSURE: 186 MMHG

## 2019-02-04 VITALS — DIASTOLIC BLOOD PRESSURE: 55 MMHG | SYSTOLIC BLOOD PRESSURE: 140 MMHG

## 2019-02-04 VITALS — SYSTOLIC BLOOD PRESSURE: 161 MMHG | DIASTOLIC BLOOD PRESSURE: 58 MMHG

## 2019-02-04 DIAGNOSIS — I11.0: ICD-10-CM

## 2019-02-04 DIAGNOSIS — D64.9: ICD-10-CM

## 2019-02-04 DIAGNOSIS — S36.114A: Primary | ICD-10-CM

## 2019-02-04 DIAGNOSIS — Z86.73: ICD-10-CM

## 2019-02-04 DIAGNOSIS — K21.9: ICD-10-CM

## 2019-02-04 DIAGNOSIS — S31.611A: ICD-10-CM

## 2019-02-04 DIAGNOSIS — S61.411A: ICD-10-CM

## 2019-02-04 DIAGNOSIS — I25.10: ICD-10-CM

## 2019-02-04 DIAGNOSIS — E83.42: ICD-10-CM

## 2019-02-04 DIAGNOSIS — I48.91: ICD-10-CM
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
## 2019-02-04 DIAGNOSIS — I50.9: ICD-10-CM

## 2019-02-04 DIAGNOSIS — K59.00: ICD-10-CM

## 2019-02-04 DIAGNOSIS — I95.9: ICD-10-CM

## 2019-02-04 DIAGNOSIS — S51.012A: ICD-10-CM

## 2019-02-04 DIAGNOSIS — E78.00: ICD-10-CM

## 2019-02-04 DIAGNOSIS — E11.42: ICD-10-CM

## 2019-02-04 LAB
ALBUMIN SERPL-MCNC: 4.1 GM/DL (ref 3.2–4.5)
ALP SERPL-CCNC: 57 U/L (ref 40–136)
ALT SERPL-CCNC: 24 U/L (ref 0–55)
APTT BLD: 28 SEC (ref 24–35)
APTT PPP: YELLOW S
BACTERIA #/AREA URNS HPF: NEGATIVE /HPF
BARBITURATES UR QL: NEGATIVE
BENZODIAZ UR QL SCN: NEGATIVE
BILIRUB DIRECT SERPL-MCNC: 0.1 MG/DL (ref 0–0.3)
BILIRUB SERPL-MCNC: 0.3 MG/DL (ref 0.1–1)
BILIRUB UR QL STRIP: NEGATIVE
BUN/CREAT SERPL: 26
CALCIUM SERPL-MCNC: 9.2 MG/DL (ref 8.5–10.1)
CHLORIDE SERPL-SCNC: 106 MMOL/L (ref 98–107)
CO2 SERPL-SCNC: 20 MMOL/L (ref 21–32)
COCAINE UR QL: NEGATIVE
CREAT SERPL-MCNC: 0.7 MG/DL (ref 0.6–1.3)
D DIMER PPP FEU-MCNC: 0.46 UG/ML (ref 0–0.49)
ERYTHROCYTE [DISTWIDTH] IN BLOOD BY AUTOMATED COUNT: 17.5 % (ref 10–14.5)
FIBRINOGEN PPP-MCNC: 360 MG/DL (ref 221–496)
FIBRINOGEN PPP-MCNC: CLEAR MG/DL
GFR SERPLBLD BASED ON 1.73 SQ M-ARVRAT: > 60 ML/MIN
GLUCOSE SERPL-MCNC: 129 MG/DL (ref 70–105)
GLUCOSE UR STRIP-MCNC: NEGATIVE MG/DL
HCT VFR BLD CALC: 33 % (ref 35–52)
HGB BLD-MCNC: 10.3 G/DL (ref 11.5–16)
INR PPP: 1.1 (ref 0.8–1.4)
KETONES UR QL STRIP: NEGATIVE
LEUKOCYTE ESTERASE UR QL STRIP: NEGATIVE
MAGNESIUM SERPL-MCNC: 1.6 MG/DL (ref 1.8–2.4)
MCH RBC QN AUTO: 28 PG (ref 25–34)
MCHC RBC AUTO-ENTMCNC: 32 G/DL (ref 32–36)
MCV RBC AUTO: 89 FL (ref 80–99)
METHADONE UR QL SCN: NEGATIVE
METHAMPHETAMINE SCREEN URINE S: NEGATIVE
NITRITE UR QL STRIP: NEGATIVE
OPIATES UR QL SCN: NEGATIVE
OXYCODONE UR QL: NEGATIVE
PH UR STRIP: 6.5 [PH] (ref 5–9)
PHOSPHATE SERPL-MCNC: 3.4 MG/DL (ref 2.3–4.7)
PLATELET # BLD: 164 10^3/UL (ref 130–400)
PMV BLD AUTO: 12 FL (ref 7.4–10.4)
POTASSIUM SERPL-SCNC: 4.5 MMOL/L (ref 3.6–5)
PROPOXYPH UR QL: NEGATIVE
PROT SERPL-MCNC: 6.4 GM/DL (ref 6.4–8.2)
PROT UR QL STRIP: (no result)
PROTHROMBIN TIME: 14.4 SEC (ref 12.2–14.7)
RBC #/AREA URNS HPF: (no result) /HPF
SODIUM SERPL-SCNC: 138 MMOL/L (ref 135–145)
SP GR UR STRIP: 1.01 (ref 1.02–1.02)
TRICYCLICS UR QL SCN: NEGATIVE
UROBILINOGEN UR-MCNC: NORMAL MG/DL
WBC # BLD AUTO: 7.9 10^3/UL (ref 4.3–11)
WBC #/AREA URNS HPF: (no result) /HPF

## 2019-02-04 PROCEDURE — 51702 INSERT TEMP BLADDER CATH: CPT

## 2019-02-04 PROCEDURE — 80306 DRUG TEST PRSMV INSTRMNT: CPT

## 2019-02-04 PROCEDURE — 85610 PROTHROMBIN TIME: CPT

## 2019-02-04 PROCEDURE — 99291 CRITICAL CARE FIRST HOUR: CPT

## 2019-02-04 PROCEDURE — 84100 ASSAY OF PHOSPHORUS: CPT

## 2019-02-04 PROCEDURE — 81000 URINALYSIS NONAUTO W/SCOPE: CPT

## 2019-02-04 PROCEDURE — 80048 BASIC METABOLIC PNL TOTAL CA: CPT

## 2019-02-04 PROCEDURE — 87081 CULTURE SCREEN ONLY: CPT

## 2019-02-04 PROCEDURE — 80320 DRUG SCREEN QUANTALCOHOLS: CPT

## 2019-02-04 PROCEDURE — 94664 DEMO&/EVAL PT USE INHALER: CPT

## 2019-02-04 PROCEDURE — 83880 ASSAY OF NATRIURETIC PEPTIDE: CPT

## 2019-02-04 PROCEDURE — 93041 RHYTHM ECG TRACING: CPT

## 2019-02-04 PROCEDURE — 36415 COLL VENOUS BLD VENIPUNCTURE: CPT

## 2019-02-04 PROCEDURE — 93306 TTE W/DOPPLER COMPLETE: CPT

## 2019-02-04 PROCEDURE — 84484 ASSAY OF TROPONIN QUANT: CPT

## 2019-02-04 PROCEDURE — 82962 GLUCOSE BLOOD TEST: CPT

## 2019-02-04 PROCEDURE — 86900 BLOOD TYPING SEROLOGIC ABO: CPT

## 2019-02-04 PROCEDURE — 85730 THROMBOPLASTIN TIME PARTIAL: CPT

## 2019-02-04 PROCEDURE — 90715 TDAP VACCINE 7 YRS/> IM: CPT

## 2019-02-04 PROCEDURE — 71045 X-RAY EXAM CHEST 1 VIEW: CPT

## 2019-02-04 PROCEDURE — 86850 RBC ANTIBODY SCREEN: CPT

## 2019-02-04 PROCEDURE — 80076 HEPATIC FUNCTION PANEL: CPT

## 2019-02-04 PROCEDURE — 85027 COMPLETE CBC AUTOMATED: CPT

## 2019-02-04 PROCEDURE — 0JQ83ZZ REPAIR ABDOMEN SUBCUTANEOUS TISSUE AND FASCIA, PERCUTANEOUS APPROACH: ICD-10-PCS | Performed by: SURGERY

## 2019-02-04 PROCEDURE — 85379 FIBRIN DEGRADATION QUANT: CPT

## 2019-02-04 PROCEDURE — 85384 FIBRINOGEN ACTIVITY: CPT

## 2019-02-04 PROCEDURE — 86901 BLOOD TYPING SEROLOGIC RH(D): CPT

## 2019-02-04 PROCEDURE — 83735 ASSAY OF MAGNESIUM: CPT

## 2019-02-04 PROCEDURE — 80053 COMPREHEN METABOLIC PANEL: CPT

## 2019-02-04 RX ADMIN — INSULIN ASPART SCH UNIT: 100 INJECTION, SOLUTION INTRAVENOUS; SUBCUTANEOUS at 21:10

## 2019-02-04 RX ADMIN — HYDROCODONE BITARTRATE AND ACETAMINOPHEN PRN TAB: 5; 325 TABLET ORAL at 21:08

## 2019-02-04 RX ADMIN — NIFEDIPINE SCH MG: 60 TABLET, FILM COATED, EXTENDED RELEASE ORAL at 18:01
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
## 2019-02-04 NOTE — NUR
1358- CALLED AND NOTIFIED PTS SON ADELITA OF PTS CONDITION.



1400- GOT IRIWN OF [facility], FAMILY MEMBER, PER PT REQUEST, TO UPDATE HER OF PT 
CONDITION.
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
## 2019-02-05 VITALS — DIASTOLIC BLOOD PRESSURE: 50 MMHG | SYSTOLIC BLOOD PRESSURE: 98 MMHG

## 2019-02-05 VITALS — DIASTOLIC BLOOD PRESSURE: 54 MMHG | SYSTOLIC BLOOD PRESSURE: 100 MMHG

## 2019-02-05 VITALS — DIASTOLIC BLOOD PRESSURE: 36 MMHG | SYSTOLIC BLOOD PRESSURE: 93 MMHG

## 2019-02-05 VITALS — SYSTOLIC BLOOD PRESSURE: 119 MMHG | DIASTOLIC BLOOD PRESSURE: 51 MMHG

## 2019-02-05 VITALS — DIASTOLIC BLOOD PRESSURE: 49 MMHG | SYSTOLIC BLOOD PRESSURE: 115 MMHG

## 2019-02-05 VITALS — DIASTOLIC BLOOD PRESSURE: 47 MMHG | SYSTOLIC BLOOD PRESSURE: 116 MMHG

## 2019-02-05 VITALS — DIASTOLIC BLOOD PRESSURE: 40 MMHG | SYSTOLIC BLOOD PRESSURE: 100 MMHG

## 2019-02-05 VITALS — DIASTOLIC BLOOD PRESSURE: 44 MMHG | SYSTOLIC BLOOD PRESSURE: 114 MMHG

## 2019-02-05 VITALS — DIASTOLIC BLOOD PRESSURE: 41 MMHG | SYSTOLIC BLOOD PRESSURE: 109 MMHG

## 2019-02-05 VITALS — DIASTOLIC BLOOD PRESSURE: 42 MMHG | SYSTOLIC BLOOD PRESSURE: 102 MMHG

## 2019-02-05 VITALS — DIASTOLIC BLOOD PRESSURE: 58 MMHG | SYSTOLIC BLOOD PRESSURE: 115 MMHG

## 2019-02-05 VITALS — SYSTOLIC BLOOD PRESSURE: 91 MMHG | DIASTOLIC BLOOD PRESSURE: 34 MMHG

## 2019-02-05 VITALS — DIASTOLIC BLOOD PRESSURE: 49 MMHG | SYSTOLIC BLOOD PRESSURE: 116 MMHG

## 2019-02-05 VITALS — DIASTOLIC BLOOD PRESSURE: 54 MMHG | SYSTOLIC BLOOD PRESSURE: 112 MMHG

## 2019-02-05 VITALS — SYSTOLIC BLOOD PRESSURE: 118 MMHG | DIASTOLIC BLOOD PRESSURE: 48 MMHG

## 2019-02-05 VITALS — SYSTOLIC BLOOD PRESSURE: 116 MMHG | DIASTOLIC BLOOD PRESSURE: 44 MMHG

## 2019-02-05 LAB
ALBUMIN SERPL-MCNC: 3.5 GM/DL (ref 3.2–4.5)
ALP SERPL-CCNC: 47 U/L (ref 40–136)
ALT SERPL-CCNC: 22 U/L (ref 0–55)
BASOPHILS # BLD AUTO: 0 10^3/UL (ref 0–0.1)
BASOPHILS NFR BLD AUTO: 0 % (ref 0–10)
BILIRUB SERPL-MCNC: 0.5 MG/DL (ref 0.1–1)
BUN/CREAT SERPL: 22
CALCIUM SERPL-MCNC: 8.4 MG/DL (ref 8.5–10.1)
CHLORIDE SERPL-SCNC: 101 MMOL/L (ref 98–107)
CO2 SERPL-SCNC: 20 MMOL/L (ref 21–32)
CREAT SERPL-MCNC: 0.74 MG/DL (ref 0.6–1.3)
EOSINOPHIL # BLD AUTO: 0.1 10^3/UL (ref 0–0.3)
EOSINOPHIL NFR BLD AUTO: 1 % (ref 0–10)
ERYTHROCYTE [DISTWIDTH] IN BLOOD BY AUTOMATED COUNT: 17.7 % (ref 10–14.5)
GFR SERPLBLD BASED ON 1.73 SQ M-ARVRAT: > 60 ML/MIN
GLUCOSE SERPL-MCNC: 136 MG/DL (ref 70–105)
HCT VFR BLD CALC: 29 % (ref 35–52)
HGB BLD-MCNC: 9.2 G/DL (ref 11.5–16)
LYMPHOCYTES # BLD AUTO: 2.2 X 10^3 (ref 1–4)
LYMPHOCYTES NFR BLD AUTO: 22 % (ref 12–44)
MAGNESIUM SERPL-MCNC: 1.5 MG/DL (ref 1.8–2.4)
MANUAL DIFFERENTIAL PERFORMED BLD QL: NO
MCH RBC QN AUTO: 28 PG (ref 25–34)
MCHC RBC AUTO-ENTMCNC: 32 G/DL (ref 32–36)
MCV RBC AUTO: 88 FL (ref 80–99)
MONOCYTES # BLD AUTO: 1.3 X 10^3 (ref 0–1)
MONOCYTES NFR BLD AUTO: 13 % (ref 0–12)
NEUTROPHILS # BLD AUTO: 6.2 X 10^3 (ref 1.8–7.8)
NEUTROPHILS NFR BLD AUTO: 63 % (ref 42–75)
PHOSPHATE SERPL-MCNC: 3.3 MG/DL (ref 2.3–4.7)
PLATELET # BLD: 168 10^3/UL (ref 130–400)
PMV BLD AUTO: 11.6 FL (ref 7.4–10.4)
POTASSIUM SERPL-SCNC: 4.5 MMOL/L (ref 3.6–5)
PROT SERPL-MCNC: 5.6 GM/DL (ref 6.4–8.2)
SODIUM SERPL-SCNC: 132 MMOL/L (ref 135–145)
WBC # BLD AUTO: 9.8 10^3/UL (ref 4.3–11)

## 2019-02-05 RX ADMIN — MAGNESIUM SULFATE IN DEXTROSE SCH MLS/HR: 10 INJECTION, SOLUTION INTRAVENOUS at 08:17

## 2019-02-05 RX ADMIN — HYDROCODONE BITARTRATE AND ACETAMINOPHEN PRN TAB: 5; 325 TABLET ORAL at 15:17

## 2019-02-05 RX ADMIN — INSULIN ASPART SCH UNIT: 100 INJECTION, SOLUTION INTRAVENOUS; SUBCUTANEOUS at 17:13

## 2019-02-05 RX ADMIN — INSULIN ASPART SCH UNIT: 100 INJECTION, SOLUTION INTRAVENOUS; SUBCUTANEOUS at 21:02

## 2019-02-05 RX ADMIN — NIFEDIPINE SCH MG: 60 TABLET, FILM COATED, EXTENDED RELEASE ORAL at 08:09

## 2019-02-05 RX ADMIN — INSULIN ASPART SCH UNIT: 100 INJECTION, SOLUTION INTRAVENOUS; SUBCUTANEOUS at 13:57

## 2019-02-05 RX ADMIN — HYDROCODONE BITARTRATE AND ACETAMINOPHEN PRN TAB: 5; 325 TABLET ORAL at 08:08

## 2019-02-05 RX ADMIN — HYDROCODONE BITARTRATE AND ACETAMINOPHEN PRN TAB: 5; 325 TABLET ORAL at 21:31

## 2019-02-05 RX ADMIN — INSULIN ASPART SCH UNIT: 100 INJECTION, SOLUTION INTRAVENOUS; SUBCUTANEOUS at 06:57

## 2019-02-05 RX ADMIN — MAGNESIUM SULFATE IN DEXTROSE SCH MLS/HR: 10 INJECTION, SOLUTION INTRAVENOUS at 09:27

## 2019-02-05 NOTE — NUR
Abd drsg removed. Wound is well approximated, no redness, no draining, no streaking. No s/s 
infection at this time. Wound cleaned with NS soaked gauze et patted dry. Xeroform placed on 
wound et covered with 4x4. Drsg secured with opsite. Wound to Lt elbow cleansed with NS 
judi et redressed with gauze et hypafix tape.
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
## 2019-02-05 NOTE — NUR
Dr. Lee called et asked about labs ordered this AM. Stated he does not want fluids 
going. Fluids DC'd at this time. Dr. Lee states to change abd wound BID with xeroform. 
States he will be here around 12:00 to see pt
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
## 2019-02-05 NOTE — NUR
SPOKE WITH THE PATIENT ABOUT HER MEDICATIONS, WE WENT OVER THE EXT MED HX AND SHE VERIFIED 
HOW SHE TAKES HER MEDICATIONS.



SHE FILLED LISINOPRIL 10MG #90 1-30-19 HOWEVER SHE STATES SHE IS NOT TAKING THIS DOSE, SHE 
TAKES THE 20MG BID THAT WAS FILLED EARLIER IN THE MONTH.



SHE STATES SHE IS NO LONGER TAKING THE CLONIDINE THAT WAS FILLED 1-11-19 FOR A 90 DAY 
SUPPLY.



HER NIFEDIPINE IS PAST DUE FOR REFILL, SHE STATES THIS WAS STOPPED FOR A SHORT TIME BUT HAS 
SINCE BEEN RESUMED AND THAT IS WHY IT IS PAST DUE FOR REFILL.



SHE TAKES PRILOSEC DAILY,  2 DAILY, AND IMODIUM PRN OTC.

## 2019-02-05 NOTE — NUR
ABD dressing removed and changed.  No redness, no draining, and signs of infection noticed . 
Wound cleaned with NS soaked gauze. Xeroform placed on wound et covered with 4x4, dressing 
secured with opsite. Wound to left elbow cleaned with NS et redressed with gauze et tape.
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
## 2019-02-05 NOTE — NUR
ABD dressing removed and changed.  No redness, no draining, and signs of infection noticed . 
Wound cleaned with NS soaked gauze. Xeroform placed on wound et covered with 4x4, dressing 
secured with opsite. Wound to left elbow cleaned with NS et redressed with gauze et tape.

-------------------------------------------------------------------------------

Addendum: 02/06/19 at 0657 by EMILEE TALBERT RN

-------------------------------------------------------------------------------

By mistake, this entry was put on the wrong time. it was meant to be 1694
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
## 2019-02-05 NOTE — ANESTHESIA-GENERAL POST-OP
General


Patient Condition


Mental Status/LOC:  Same as Preop


Cardiovascular:  Satisfactory


Nausea/Vomiting:  Absent


Respiratory:  Satisfactory


Pain:  Controlled


Complications:  Absent





Post Op Complications


Complications


None





Follow Up Care/Instructions


Patient Instructions


None needed.





Anesthesia/Patient Condition


Patient Condition


Patient is doing well, no complaints, stable vital signs, no apparent adverse 

anesthesia problems.











SAAD PRASAD DO Feb 5, 2019 16:58
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
## 2019-02-06 VITALS — DIASTOLIC BLOOD PRESSURE: 64 MMHG | SYSTOLIC BLOOD PRESSURE: 135 MMHG

## 2019-02-06 VITALS — DIASTOLIC BLOOD PRESSURE: 56 MMHG | SYSTOLIC BLOOD PRESSURE: 121 MMHG

## 2019-02-06 LAB
ALBUMIN SERPL-MCNC: 3.4 GM/DL (ref 3.2–4.5)
ALP SERPL-CCNC: 49 U/L (ref 40–136)
ALT SERPL-CCNC: 16 U/L (ref 0–55)
BILIRUB SERPL-MCNC: 0.4 MG/DL (ref 0.1–1)
BUN/CREAT SERPL: 19
CALCIUM SERPL-MCNC: 8.8 MG/DL (ref 8.5–10.1)
CHLORIDE SERPL-SCNC: 102 MMOL/L (ref 98–107)
CO2 SERPL-SCNC: 25 MMOL/L (ref 21–32)
CREAT SERPL-MCNC: 0.74 MG/DL (ref 0.6–1.3)
ERYTHROCYTE [DISTWIDTH] IN BLOOD BY AUTOMATED COUNT: 18 % (ref 10–14.5)
GFR SERPLBLD BASED ON 1.73 SQ M-ARVRAT: > 60 ML/MIN
GLUCOSE SERPL-MCNC: 127 MG/DL (ref 70–105)
HCT VFR BLD CALC: 29 % (ref 35–52)
HGB BLD-MCNC: 9.4 G/DL (ref 11.5–16)
MAGNESIUM SERPL-MCNC: 2.3 MG/DL (ref 1.8–2.4)
MCH RBC QN AUTO: 29 PG (ref 25–34)
MCHC RBC AUTO-ENTMCNC: 33 G/DL (ref 32–36)
MCV RBC AUTO: 88 FL (ref 80–99)
PLATELET # BLD: 173 10^3/UL (ref 130–400)
PMV BLD AUTO: 11.8 FL (ref 7.4–10.4)
POTASSIUM SERPL-SCNC: 4.5 MMOL/L (ref 3.6–5)
PROT SERPL-MCNC: 5.4 GM/DL (ref 6.4–8.2)
SODIUM SERPL-SCNC: 134 MMOL/L (ref 135–145)
WBC # BLD AUTO: 7.6 10^3/UL (ref 4.3–11)

## 2019-02-06 RX ADMIN — INSULIN ASPART SCH UNIT: 100 INJECTION, SOLUTION INTRAVENOUS; SUBCUTANEOUS at 11:45

## 2019-02-06 RX ADMIN — INSULIN ASPART SCH UNIT: 100 INJECTION, SOLUTION INTRAVENOUS; SUBCUTANEOUS at 06:06

## 2019-02-06 RX ADMIN — NIFEDIPINE SCH MG: 60 TABLET, FILM COATED, EXTENDED RELEASE ORAL at 08:50
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
## 2019-02-06 NOTE — NUR
TERESSA SINGH demonstrates understanding of discharge instructions and accurately returns 
instructions upon questioning.  Copy of Post-Discharge Instructions and Medication Discharge 
Instructions given to patient. TERESSA SINGH is able to manage continuing needs after 
discharge.  Patients belongings returned to patient. Skin dry and intact; no breakdown 
noted. Patient discharged from [facility] on 2/6/19 at 1410. TERESSA SINGH left floor via 
wheel chair, accompanied by staff and family. d/c medication addressed with dr tristan 
before discharge, f/u appointment, and dressing changes to the wound and extra supplies 
given to lois last till next dr appointment
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
## 2019-10-28 ENCOUNTER — HOSPITAL ENCOUNTER (EMERGENCY)
Dept: HOSPITAL 75 - ER | Age: 84
Discharge: HOME | End: 2019-10-28
Payer: MEDICARE

## 2019-10-28 VITALS — SYSTOLIC BLOOD PRESSURE: 102 MMHG | DIASTOLIC BLOOD PRESSURE: 56 MMHG

## 2019-10-28 VITALS — BODY MASS INDEX: 21.02 KG/M2 | WEIGHT: 114.2 LBS | HEIGHT: 61.97 IN

## 2019-10-28 DIAGNOSIS — K59.00: ICD-10-CM

## 2019-10-28 DIAGNOSIS — E11.40: ICD-10-CM

## 2019-10-28 DIAGNOSIS — I10: ICD-10-CM

## 2019-10-28 DIAGNOSIS — Z90.710: ICD-10-CM

## 2019-10-28 DIAGNOSIS — I48.91: ICD-10-CM

## 2019-10-28 DIAGNOSIS — Z79.84: ICD-10-CM

## 2019-10-28 DIAGNOSIS — Z86.73: ICD-10-CM

## 2019-10-28 DIAGNOSIS — Z87.19: ICD-10-CM

## 2019-10-28 DIAGNOSIS — E78.00: ICD-10-CM

## 2019-10-28 DIAGNOSIS — K58.9: Primary | ICD-10-CM

## 2019-10-28 DIAGNOSIS — Z98.51: ICD-10-CM

## 2019-10-28 DIAGNOSIS — R19.7: ICD-10-CM

## 2019-10-28 DIAGNOSIS — Z91.040: ICD-10-CM

## 2019-10-28 DIAGNOSIS — Z79.01: ICD-10-CM

## 2019-10-28 DIAGNOSIS — Z90.49: ICD-10-CM

## 2019-10-28 DIAGNOSIS — K21.9: ICD-10-CM

## 2019-10-28 DIAGNOSIS — Z88.2: ICD-10-CM

## 2019-10-28 DIAGNOSIS — Z88.0: ICD-10-CM

## 2019-10-28 DIAGNOSIS — Z88.5: ICD-10-CM

## 2019-10-28 LAB
ALBUMIN SERPL-MCNC: 4.4 GM/DL (ref 3.2–4.5)
ALP SERPL-CCNC: 101 U/L (ref 40–136)
ALT SERPL-CCNC: 23 U/L (ref 0–55)
AMYLASE SERPL-CCNC: 63 U/L (ref 25–125)
APTT PPP: YELLOW S
BACTERIA #/AREA URNS HPF: NEGATIVE /HPF
BASOPHILS # BLD AUTO: 0.1 10^3/UL (ref 0–0.1)
BASOPHILS NFR BLD AUTO: 1 % (ref 0–10)
BILIRUB SERPL-MCNC: 0.2 MG/DL (ref 0.1–1)
BILIRUB UR QL STRIP: NEGATIVE
BUN/CREAT SERPL: 19
CALCIUM SERPL-MCNC: 10 MG/DL (ref 8.5–10.1)
CHLORIDE SERPL-SCNC: 102 MMOL/L (ref 98–107)
CO2 SERPL-SCNC: 20 MMOL/L (ref 21–32)
CREAT SERPL-MCNC: 0.88 MG/DL (ref 0.6–1.3)
EOSINOPHIL # BLD AUTO: 0.5 10^3/UL (ref 0–0.3)
EOSINOPHIL NFR BLD AUTO: 7 % (ref 0–10)
ERYTHROCYTE [DISTWIDTH] IN BLOOD BY AUTOMATED COUNT: 16.6 % (ref 10–14.5)
FIBRINOGEN PPP-MCNC: CLEAR MG/DL
GFR SERPLBLD BASED ON 1.73 SQ M-ARVRAT: > 60 ML/MIN
GLUCOSE SERPL-MCNC: 159 MG/DL (ref 70–105)
GLUCOSE UR STRIP-MCNC: NEGATIVE MG/DL
HCT VFR BLD CALC: 38 % (ref 35–52)
HGB BLD-MCNC: 12.1 G/DL (ref 11.5–16)
KETONES UR QL STRIP: NEGATIVE
LEUKOCYTE ESTERASE UR QL STRIP: (no result)
LIPASE SERPL-CCNC: 30 U/L (ref 8–78)
LYMPHOCYTES # BLD AUTO: 2.2 X 10^3 (ref 1–4)
LYMPHOCYTES NFR BLD AUTO: 31 % (ref 12–44)
MANUAL DIFFERENTIAL PERFORMED BLD QL: NO
MCH RBC QN AUTO: 27 PG (ref 25–34)
MCHC RBC AUTO-ENTMCNC: 32 G/DL (ref 32–36)
MCV RBC AUTO: 84 FL (ref 80–99)
MONOCYTES # BLD AUTO: 1 X 10^3 (ref 0–1)
MONOCYTES NFR BLD AUTO: 14 % (ref 0–12)
NEUTROPHILS # BLD AUTO: 3.4 X 10^3 (ref 1.8–7.8)
NEUTROPHILS NFR BLD AUTO: 47 % (ref 42–75)
NITRITE UR QL STRIP: NEGATIVE
PH UR STRIP: 5 [PH] (ref 5–9)
PLATELET # BLD: 213 10^3/UL (ref 130–400)
PMV BLD AUTO: 11.4 FL (ref 7.4–10.4)
POTASSIUM SERPL-SCNC: 4.9 MMOL/L (ref 3.6–5)
PROT SERPL-MCNC: 7.4 GM/DL (ref 6.4–8.2)
PROT UR QL STRIP: NEGATIVE
RBC #/AREA URNS HPF: (no result) /HPF
SODIUM SERPL-SCNC: 134 MMOL/L (ref 135–145)
SP GR UR STRIP: 1.01 (ref 1.02–1.02)
SQUAMOUS #/AREA URNS HPF: (no result) /HPF
WBC # BLD AUTO: 7.1 10^3/UL (ref 4.3–11)
WBC #/AREA URNS HPF: (no result) /HPF

## 2019-10-28 PROCEDURE — 85025 COMPLETE CBC W/AUTO DIFF WBC: CPT

## 2019-10-28 PROCEDURE — 36415 COLL VENOUS BLD VENIPUNCTURE: CPT

## 2019-10-28 PROCEDURE — 74177 CT ABD & PELVIS W/CONTRAST: CPT

## 2019-10-28 PROCEDURE — 82150 ASSAY OF AMYLASE: CPT

## 2019-10-28 PROCEDURE — 83690 ASSAY OF LIPASE: CPT

## 2019-10-28 PROCEDURE — 96360 HYDRATION IV INFUSION INIT: CPT

## 2019-10-28 PROCEDURE — 80053 COMPREHEN METABOLIC PANEL: CPT

## 2019-10-28 PROCEDURE — 81000 URINALYSIS NONAUTO W/SCOPE: CPT

## 2019-10-28 NOTE — ED ABDOMINAL PAIN
General


Chief Complaint:  Abdominal/GI Problems


Stated Complaint:  ABD PAIN





History of Present Illness


Date Seen by Provider:  Oct 28, 2019


Time Seen by Provider:  11:30


Initial Comments


85-year-old  female presents after seeing her primary care provider 

earlier today for abdominal pain. She's had a previous bowel obstruction, 

approximately 2 years ago. She reports intermittent constipation and diarrhea, 

over the last 3-4 week. She had one episode of diarrhea earlier today. She 

denies any nausea or vomiting at this time. She reports approximately 8 pound 

weight gain over the last month and notices distention in her upper abdomen.


Timing/Duration:  Getting Worse, Intermittent


Severity/Quality:  Mild


Location:  RUQ, LUQ, Epigastric


Radiation:  No Radiation


Activities at Onset:  None


Associated Symptoms:  No Back Pain, No Chest Pain, No Diaphoresis, No 

Fever/Chills, No Fatigue, No Headache, No Heartburn, No Nausea/Vomiting, No 

Rash, No Shortness of Air; Swelling/Mass in Abdomen; No Syncope, No Weakness





Allergies and Home Medications


Allergies


Coded Allergies:  


     Penicillins (Verified  Allergy, Unknown, 17)


     Sulfa (Sulfonamide Antibiotics) (Verified  Allergy, Unknown, 17)


     latex (Verified  Allergy, Unknown, 17)


     meperidine (Verified  Allergy, Unknown, 17)





Home Medications


Acarbose 100 Mg Tablet, 100 MG PO TID, (Reported)


Allopurinol 100 Mg Tablet, 100 MG PO DAILY, (Reported)


Apixaban 2.5 Mg Tablet, 2.5 MG PO BID, (Reported)


Atorvastatin Calcium 40 Mg Tablet, 40 MG PO HS, (Reported)


Dicyclomine HCl 10 Mg Capsule, 10 MG PO QIDACHS


   Prescribed by: CONSUELO HERNÁNDEZ on 10/28/19 1334


Digoxin 125 Mcg Tablet, 125 MCG PO DAILY, (Reported)


Furosemide 20 Mg Tablet, 20 MG PO DAILY


   Prescribed by: SIVA SMITH on 19 1344


Hydrocodone Bit/Acetaminophen 1 Tab Tab, 1 TAB PO Q4-6HR PRN for PAIN-MODERATE


   Prescribed by: VENUS MOHAMUD on 19 1259


Isosorbide Mononitrate 30 Mg Tab.er.24h, 30 MG PO BID, (Reported)


Lisinopril 20 Mg Tablet, 20 MG PO BID, (Reported)


Loperamide HCl 2 Mg Capsule, PO UD PRN for DIARRHEA, (Reported)


   PER OTC INSTRUCTIONS 


Magnesium Oxide 250 Mg Tablet, 500 MG PO DAILY, (Reported)


Metformin HCl 500 Mg Tablet, 500 MG PO BID, (Reported)


Nifedipine 60 Mg Tablet.er, 60 MG PO DAILY


   Prescribed by: SIVA SMITH on 19 1344


Omeprazole Magnesium 20 Mg Tablet.dr, 20 MG PO DAILY, (Reported)





Patient Home Medication List


Home Medication List Reviewed:  Yes





Review of Systems


Review of Systems


Constitutional:  no symptoms reported, see HPI


Gastrointestinal:  See HPI, Abdomen Distended, Abdominal Pain, Constipated, 

Diarrhea; Denies Difficulty Swallowing, Denies Nausea, Denies Poor Appetite, 

Denies Poor Fluid Intake, Denies Rectal Bleeding, Denies Vomiting





All Other Systems Reviewed


Negative Unless Noted:  Yes





Past Medical-Social-Family Hx


Past Med/Social Hx:  Reviewed Nursing Past Med/Soc Hx


Patient Social History


Recent Hopitalizations:  No





Immunizations Up To Date


Tetanus Booster (TDap):  More than 5yrs


PED Vaccines UTD:  Yes


Date of Pneumonia Vaccine:  Oct 1, 2018


Date of Influenza Vaccine:  Oct 1, 2018





Past Medical History


Surgeries:  Yes (BOWEL RESECTION 2017; BSO)


Abdominal, Appendectomy, Bowel Surgery, Hysterectomy, Oophorectomy, Tubal 

Ligation


Respiratory:  No


Cardiac:  Yes


Atrial Fibrillation, Chronic Edema/Swelling, High Cholesterol, Hypertension


Neurological:  Yes


Neuropathy, TIA


GYN History:  Tubal Ligation


Genitourinary:  No


Gastrointestinal:  Yes


Colitis, Gastroesophageal Reflux, Pancreatitis


Musculoskeletal:  No


Endocrine:  Yes


Diabetes, Non-Insulin dep


HEENT:  No


Cancer:  No


Psychosocial:  No


Integumentary:  No


Blood Disorders:  No


Adverse Reaction/Blood Tranf:  No





Family Medical History


No Pertinent Family Hx





Physical Exam


Vital Signs





Vital Signs - First Documented








 10/28/19





 11:07


 


Temp 36.0


 


Pulse 76


 


Resp 15


 


B/P (MAP) 152/63 (92)


 


Pulse Ox 99


 


O2 Delivery Room Air





Capillary Refill :


Height/Weight/BMI


Height: 5'4.00"


Weight: 114lbs. 2.0oz. 51.688525yg; 18.9 BMI


Method:Stated


General Appearance:  WD/WN, no apparent distress


HEENT:  PERRL/EOMI, normal ENT inspection, TMs normal, pharynx normal


Neck:  non-tender, full range of motion, supple, normal inspection


Respiratory:  chest non-tender, lungs clear, normal breath sounds


Cardiovascular:  normal peripheral pulses, regular rate, rhythm


Gastrointestinal:  normal bowel sounds, soft, distended (upper quadrants); No 

guarding, No rebound; tenderness (epigastric); No hernia, No mass


Neurologic/Psychiatric:  no motor/sensory deficits, alert, normal mood/affect, 

oriented x 3


Skin:  normal color, warm/dry





Progress/Results/Core Measures


Results/Orders


Lab Results





Laboratory Tests








Test


 10/28/19


11:19 10/28/19


11:55 Range/Units


 


 


Urine Color YELLOW    


 


Urine Clarity CLEAR    


 


Urine pH 5   5-9  


 


Urine Specific Gravity 1.010 L  1.016-1.022  


 


Urine Protein NEGATIVE   NEGATIVE  


 


Urine Glucose (UA) NEGATIVE   NEGATIVE  


 


Urine Ketones NEGATIVE   NEGATIVE  


 


Urine Nitrite NEGATIVE   NEGATIVE  


 


Urine Bilirubin NEGATIVE   NEGATIVE  


 


Urine Urobilinogen NORMAL   NORMAL  MG/DL


 


Urine Leukocyte Esterase 1+ H  NEGATIVE  


 


Urine RBC (Auto) NEGATIVE   NEGATIVE  


 


Urine RBC NONE    /HPF


 


Urine WBC NONE    /HPF


 


Urine Squamous Epithelial


Cells 0-2 


 


  /HPF





 


Urine Crystals NONE    /LPF


 


Urine Bacteria NEGATIVE    /HPF


 


Urine Casts NONE    /LPF


 


Urine Mucus NEGATIVE    /LPF


 


Urine Culture Indicated NO    


 


White Blood Count


 


 7.1 


 4.3-11.0


10^3/uL


 


Red Blood Count


 


 4.56 


 4.35-5.85


10^6/uL


 


Hemoglobin  12.1  11.5-16.0  G/DL


 


Hematocrit  38  35-52  %


 


Mean Corpuscular Volume  84  80-99  FL


 


Mean Corpuscular Hemoglobin  27  25-34  PG


 


Mean Corpuscular Hemoglobin


Concent 


 32 


 32-36  G/DL





 


Red Cell Distribution Width  16.6 H 10.0-14.5  %


 


Platelet Count


 


 213 


 130-400


10^3/uL


 


Mean Platelet Volume  11.4 H 7.4-10.4  FL


 


Neutrophils (%) (Auto)  47  42-75  %


 


Lymphocytes (%) (Auto)  31  12-44  %


 


Monocytes (%) (Auto)  14 H 0-12  %


 


Eosinophils (%) (Auto)  7  0-10  %


 


Basophils (%) (Auto)  1  0-10  %


 


Neutrophils # (Auto)  3.4  1.8-7.8  X 10^3


 


Lymphocytes # (Auto)  2.2  1.0-4.0  X 10^3


 


Monocytes # (Auto)  1.0  0.0-1.0  X 10^3


 


Eosinophils # (Auto)


 


 0.5 H


 0.0-0.3


10^3/uL


 


Basophils # (Auto)


 


 0.1 


 0.0-0.1


10^3/uL


 


Sodium Level  134 L 135-145  MMOL/L


 


Potassium Level  4.9  3.6-5.0  MMOL/L


 


Chloride Level  102    MMOL/L


 


Carbon Dioxide Level  20 L 21-32  MMOL/L


 


Anion Gap  12  5-14  MMOL/L


 


Blood Urea Nitrogen  17  7-18  MG/DL


 


Creatinine


 


 0.88 


 0.60-1.30


MG/DL


 


Estimat Glomerular Filtration


Rate 


 > 60 


  





 


BUN/Creatinine Ratio  19   


 


Glucose Level  159 H   MG/DL


 


Calcium Level  10.0  8.5-10.1  MG/DL


 


Corrected Calcium  9.7  8.5-10.1  MG/DL


 


Total Bilirubin  0.2  0.1-1.0  MG/DL


 


Aspartate Amino Transf


(AST/SGOT) 


 23 


 5-34  U/L





 


Alanine Aminotransferase


(ALT/SGPT) 


 23 


 0-55  U/L





 


Alkaline Phosphatase  101    U/L


 


Total Protein  7.4  6.4-8.2  GM/DL


 


Albumin  4.4  3.2-4.5  GM/DL


 


Amylase Level  63    U/L


 


Lipase  30  8-78  U/L








My Orders





Orders - BETTY,CONSUELO ARNP


Ua Culture If Indicated (10/28/19 11:25)


Amylase (10/28/19 11:31)


Cbc With Automated Diff (10/28/19 11:31)


Comprehensive Metabolic Panel (10/28/19 11:31)


Lipase (10/28/19 11:31)


Ct Abdomen/Pelvis W (10/28/19 11:35)


I-Stat Bedside Testing (10/28/19 11:49)


Iohexol Injection (Omnipaque 350 Mg/Ml 1 (10/28/19 12:30)


Received Contrast (Hold Metformin- Contr (10/28/19 12:30)


Sodium Chloride Flush (Catheter Flush Sy (10/28/19 12:30)


Ns (Ivpb) (Sodium Chloride 0.9% Ivpb Bag (10/28/19 12:30)


Ed Iv/Invasive Line Start (10/28/19 12:45)


Ns Iv 500 Ml (Sodium Chloride 0.9%) (10/28/19 12:45)


Medications Given in ED





Current Medications








 Medications  Dose


 Ordered  Sig/Hina


 Route  Start Time


 Stop Time Status Last Admin


Dose Admin


 


 Iohexol  100 ml  ONCE  ONCE


 IV  10/28/19 12:30


 10/28/19 12:31 DC 10/28/19 12:37


66 ML


 


 Sodium Chloride  10 ml  AS NEEDED  PRN


 IV  10/28/19 12:30


 10/28/19 14:19 DC 10/28/19 12:37


10 ML


 


 Sodium Chloride  100 ml  ONCE  ONCE


 IV  10/28/19 12:30


 10/28/19 12:31 DC 10/28/19 12:37


80 ML


 


 Sodium Chloride  500 ml @ 0


 mls/hr  Q0M ONCE


 IV  10/28/19 12:45


 10/28/19 12:47 DC 10/28/19 12:57


500 MLS/HR








Vital Signs/I&O











 10/28/19 10/28/19





 11:07 14:19


 


Temp 36.0 


 


Pulse 76 72


 


Resp 15 16


 


B/P (MAP) 152/63 (92) 102/56


 


Pulse Ox 99 100


 


O2 Delivery Room Air Room Air











Progress


Progress Note :  


   Time:  11:30


Progress Note


Patient seen and evaluated, will obtain labs and a CT with contrast of the 

abdomen and pelvis.


1215 labs all essentially normal. Saline 500 ML's per IV.


1300 CT results discussed with patient. All questions answered and discharge 

planning performed, return precautions discussed.





Diagnostic Imaging





   Diagonstic Imaging:  CT


   Plain Films/CT/US/NM/MRI:  abdomen, pelvis


Comments


NAME:   TERESSA SINGH


MED REC#:   [id number]


ACCOUNT#:   [id number]


PT STATUS:   REG ER


:   1934


PHYSICIAN:   CONSUELO HERNÁNDEZ [facility]


ADMIT DATE:   10/28/19/ER


                                   ***Draft***


POSDate of Exam:10/28/19





CT ABDOMEN/PELVIS W








PROCEDURE: 


CT abdomen and pelvis with contrast.





TECHNIQUE: 


Multiple contiguous axial images were obtained through the


abdomen and pelvis after administration of intravenous contrast.


Auto Exposure Controls were utilized during the CT exam to meet


ALARA standards for radiation dose reduction. 





INDICATION:  


Abdominal pain, bloating, and constipation.





COMPARISON: 


Correlation is made with the prior CT from 2018.





FINDINGS:


The lung bases are clear. No discrete liver mass is identified.


The gallbladder is unremarkable. No biliary ductal dilatation is


seen. The pancreas and spleen are unremarkable. No adrenal mass


is detected. The kidneys are unremarkable. The aorta is


nonaneurysmal. No central retroperitoneal or mesenteric


lymphadenopathy is seen. The small and large bowel loops are of


normal caliber. No obstruction is seen. There is a large amount


of stool within the colon. No free fluid or fluid collection is


seen. The bladder is unremarkable. No pelvic lymphadenopathy is


seen. The bony structures are nonacute.





IMPRESSION: 


There is moderate stool throughout the colon, suggestive of


constipation. The study is otherwise unremarkable. No acute


feature is identified.








  Dictated on workstation # [id number]








Dict:   10/28/19 [id number]


Trans:   10/28/19 1259


 [id number]





Interpreted by:     MARYBETH LEIGH MD





Departure


Impression





   Primary Impression:  


   Irritable bowel syndrome with both constipation and diarrhea


Disposition:   HOME, SELF-CARE


Condition:  Improved





Departure-Patient Inst.


Decision time for Depature:  13:00


Referrals:  


NARCISO BERKOWITZ DO (PCP/Family)


Primary Care Physician


Patient Instructions:  Irritable Bowel Syndrome (DC)





Add. Discharge Instructions:  


Begin taking an over-the-counter probiotic as directed on bottle. 


Increase water intake, 16 oz every 2 hours while awake. 


Walk for 20 min 1-2 times daily. 


Follow-up with your primary care provider if symptoms are not improving or 

worsen.


Consider repeating her colonoscopy if symptoms are not improving.


If symptoms are worsening consider evaluation by gastroenterologist.


Take Levsin as directed.


You may take MiraLAX one capful daily for constipation, avoid taking when you're

having diarrhea.


Return to the emergency department for new, urgent health care needs.





All discharge instructions reviewed with patient and/or family. Voiced 

understanding.


Scripts


Dicyclomine HCl (Dicyclomine HCl) 10 Mg Capsule


10 MG PO QIDACHS, #40 CAP 0 Refills


   Prov: CONSUELO HERNÁNDEZ         10/28/19





Copy


Copies To 1:   NARCISO BERKOWITZ AMY ARNP                  Oct 28, 2019 11:59


POS

## 2019-10-28 NOTE — DIAGNOSTIC IMAGING REPORT
PROCEDURE: 

CT abdomen and pelvis with contrast.



TECHNIQUE: 

Multiple contiguous axial images were obtained through the

abdomen and pelvis after administration of intravenous contrast.

Auto Exposure Controls were utilized during the CT exam to meet

ALARA standards for radiation dose reduction. 



INDICATION:  

Abdominal pain, bloating, and constipation.



COMPARISON: 

Correlation is made with the prior CT from 11/25/2018.



FINDINGS:

The lung bases are clear. No discrete liver mass is identified.

The gallbladder is unremarkable. No biliary ductal dilatation is

seen. The pancreas and spleen are unremarkable. No adrenal mass

is detected. The kidneys are unremarkable. The aorta is

nonaneurysmal. No central retroperitoneal or mesenteric

lymphadenopathy is seen. The small and large bowel loops are of

normal caliber. No obstruction is seen. There is a large amount

of stool within the colon. No free fluid or fluid collection is

seen. The bladder is unremarkable. No pelvic lymphadenopathy is

seen. The bony structures are nonacute.



IMPRESSION: 

There is moderate stool throughout the colon, suggestive of

constipation. The study is otherwise unremarkable. No acute

feature is identified.



Dictated by: 



  Dictated on workstation # [id number]

## 2020-02-03 ENCOUNTER — HOSPITAL ENCOUNTER (OUTPATIENT)
Dept: HOSPITAL 75 - CARD | Age: 85
End: 2020-02-03
Attending: PHYSICIAN ASSISTANT
Payer: MEDICARE

## 2020-02-03 DIAGNOSIS — I07.1: ICD-10-CM

## 2020-02-03 DIAGNOSIS — I10: ICD-10-CM

## 2020-02-03 DIAGNOSIS — I25.10: Primary | ICD-10-CM

## 2020-02-03 DIAGNOSIS — I65.29: ICD-10-CM

## 2020-02-03 PROCEDURE — 93306 TTE W/DOPPLER COMPLETE: CPT

## 2020-06-12 ENCOUNTER — HOSPITAL ENCOUNTER (EMERGENCY)
Dept: HOSPITAL 75 - ER | Age: 85
Discharge: HOME | End: 2020-06-12
Payer: MEDICARE

## 2020-06-12 VITALS — WEIGHT: 114.2 LBS | HEIGHT: 61.81 IN | BODY MASS INDEX: 21.02 KG/M2

## 2020-06-12 VITALS — DIASTOLIC BLOOD PRESSURE: 65 MMHG | SYSTOLIC BLOOD PRESSURE: 157 MMHG

## 2020-06-12 DIAGNOSIS — Z86.73: ICD-10-CM

## 2020-06-12 DIAGNOSIS — Z79.84: ICD-10-CM

## 2020-06-12 DIAGNOSIS — Z91.040: ICD-10-CM

## 2020-06-12 DIAGNOSIS — Z79.01: ICD-10-CM

## 2020-06-12 DIAGNOSIS — K21.9: ICD-10-CM

## 2020-06-12 DIAGNOSIS — Z88.5: ICD-10-CM

## 2020-06-12 DIAGNOSIS — I10: ICD-10-CM

## 2020-06-12 DIAGNOSIS — Z88.0: ICD-10-CM

## 2020-06-12 DIAGNOSIS — E11.40: ICD-10-CM

## 2020-06-12 DIAGNOSIS — I48.91: ICD-10-CM

## 2020-06-12 DIAGNOSIS — R10.84: Primary | ICD-10-CM

## 2020-06-12 DIAGNOSIS — E78.00: ICD-10-CM

## 2020-06-12 DIAGNOSIS — Z88.2: ICD-10-CM

## 2020-06-12 LAB
ALBUMIN SERPL-MCNC: 4.5 GM/DL (ref 3.2–4.5)
ALP SERPL-CCNC: 82 U/L (ref 40–136)
ALT SERPL-CCNC: 21 U/L (ref 0–55)
BASOPHILS # BLD AUTO: 0.1 10^3/UL (ref 0–0.1)
BASOPHILS NFR BLD AUTO: 1 % (ref 0–10)
BILIRUB SERPL-MCNC: 0.2 MG/DL (ref 0.1–1)
BUN/CREAT SERPL: 20
CALCIUM SERPL-MCNC: 10.1 MG/DL (ref 8.5–10.1)
CHLORIDE SERPL-SCNC: 98 MMOL/L (ref 98–107)
CO2 SERPL-SCNC: 24 MMOL/L (ref 21–32)
CREAT SERPL-MCNC: 1.14 MG/DL (ref 0.6–1.3)
EOSINOPHIL # BLD AUTO: 0.6 10^3/UL (ref 0–0.3)
EOSINOPHIL NFR BLD AUTO: 6 % (ref 0–10)
ERYTHROCYTE [DISTWIDTH] IN BLOOD BY AUTOMATED COUNT: 16.6 % (ref 10–14.5)
GFR SERPLBLD BASED ON 1.73 SQ M-ARVRAT: 45 ML/MIN
GLUCOSE SERPL-MCNC: 161 MG/DL (ref 70–105)
HCT VFR BLD CALC: 35 % (ref 35–52)
HGB BLD-MCNC: 11.4 G/DL (ref 11.5–16)
LIPASE SERPL-CCNC: 51 U/L (ref 8–78)
LYMPHOCYTES # BLD AUTO: 3 X 10^3 (ref 1–4)
LYMPHOCYTES NFR BLD AUTO: 33 % (ref 12–44)
MANUAL DIFFERENTIAL PERFORMED BLD QL: NO
MCH RBC QN AUTO: 28 PG (ref 25–34)
MCHC RBC AUTO-ENTMCNC: 33 G/DL (ref 32–36)
MCV RBC AUTO: 86 FL (ref 80–99)
MONOCYTES # BLD AUTO: 1.4 X 10^3 (ref 0–1)
MONOCYTES NFR BLD AUTO: 15 % (ref 0–12)
NEUTROPHILS # BLD AUTO: 4.2 X 10^3 (ref 1.8–7.8)
NEUTROPHILS NFR BLD AUTO: 46 % (ref 42–75)
PLATELET # BLD: 276 10^3/UL (ref 130–400)
PMV BLD AUTO: 11.7 FL (ref 7.4–10.4)
POTASSIUM SERPL-SCNC: 5.2 MMOL/L (ref 3.6–5)
PROT SERPL-MCNC: 7.9 GM/DL (ref 6.4–8.2)
SODIUM SERPL-SCNC: 136 MMOL/L (ref 135–145)
WBC # BLD AUTO: 9.2 10^3/UL (ref 4.3–11)

## 2020-06-12 PROCEDURE — 80053 COMPREHEN METABOLIC PANEL: CPT

## 2020-06-12 PROCEDURE — 36415 COLL VENOUS BLD VENIPUNCTURE: CPT

## 2020-06-12 PROCEDURE — 83690 ASSAY OF LIPASE: CPT

## 2020-06-12 PROCEDURE — 85025 COMPLETE CBC W/AUTO DIFF WBC: CPT

## 2020-06-12 PROCEDURE — 74176 CT ABD & PELVIS W/O CONTRAST: CPT

## 2020-06-12 NOTE — ED GI
General


Chief Complaint:  Abdominal/GI Problems


Stated Complaint:  ABD BLOATING/DIARRHEA


Source of Information:  Patient


Exam Limitations:  No Limitations





History of Present Illness


Date Seen by Provider:  2020


Time Seen by Provider:  20:32


Initial Comments


To ER with abdominal bloating for 2 weeks worse today, diarrhea and passing gas.

She does report heartburn currently in the epigastric region. No fever no chills

no cough no shortness of breath.


Timing/Duration:  1-2 Days


Severity/Quality:  Moderate


Location:  Generalized Abdomen


Radiation:  No Radiation


Activities at Onset:  None





Allergies and Home Medications


Allergies


Coded Allergies:  


     Penicillins (Verified  Allergy, Unknown, 17)


     Sulfa (Sulfonamide Antibiotics) (Verified  Allergy, Unknown, 17)


     latex (Verified  Allergy, Unknown, 17)


     meperidine (Verified  Allergy, Unknown, 17)





Home Medications


Acarbose 100 Mg Tablet, 100 MG PO TID, (Reported)


Allopurinol 100 Mg Tablet, 100 MG PO DAILY, (Reported)


Apixaban 2.5 Mg Tablet, 2.5 MG PO BID, (Reported)


Atorvastatin Calcium 40 Mg Tablet, 40 MG PO HS, (Reported)


Dicyclomine HCl 10 Mg Capsule, 10 MG PO QIDACHS


   Prescribed by: CONSUELO HERNÁNDEZ on 10/28/19 1334


Dicyclomine HCl 10 Mg Capsule, 10 MG PO TID


   Prescribed by: DAVID CALLEJAS on 20 2109


Digoxin 125 Mcg Tablet, 125 MCG PO DAILY, (Reported)


Furosemide 20 Mg Tablet, 20 MG PO DAILY


   Prescribed by: SIVA SMITH on 19 1344


Hydrocodone Bit/Acetaminophen 1 Tab Tab, 1 TAB PO Q4-6HR PRN for PAIN-MODERATE


   Prescribed by: VENUS MOHAMUD on 19 1259


Isosorbide Mononitrate 30 Mg Tab.er.24h, 30 MG PO BID, (Reported)


Lisinopril 20 Mg Tablet, 20 MG PO BID, (Reported)


Loperamide HCl 2 Mg Capsule, PO UD PRN for DIARRHEA, (Reported)


   PER OTC INSTRUCTIONS 


Magnesium Oxide 250 Mg Tablet, 500 MG PO DAILY, (Reported)


Metformin HCl 500 Mg Tablet, 500 MG PO BID, (Reported)


Nifedipine 60 Mg Tablet.er, 60 MG PO DAILY


   Prescribed by: SIVA SMITH on 19 1344


Omeprazole Magnesium 20 Mg Tablet., 20 MG PO DAILY, (Reported)





Patient Home Medication List


Home Medication List Reviewed:  Yes





Review of Systems


Review of Systems


Constitutional:  see HPI


EENTM:  No Symptoms Reported


Respiratory:  No Symptoms Reported


Gastrointestinal:  See HPI, Abdominal Pain, Diarrhea


Genitourinary:  No Symptoms Reported


Musculoskeletal:  no symptoms reported


Skin:  no symptoms reported


Psychiatric/Neurological:  No Symptoms Reported





Past Medical-Social-Family Hx


Patient Social History


Recent Foreign Travel:  No


Contact w/Someone Who Travel:  No


Recent Hopitalizations:  No





Immunizations Up To Date


Tetanus Booster (TDap):  More than 5yrs


PED Vaccines UTD:  Yes


Date of Pneumonia Vaccine:  Oct 1, 2018


Date of Influenza Vaccine:  Oct 1, 2018





Past Medical History


Surgeries:  Yes (BOWEL RESECTION 2017; BSO)


Abdominal, Appendectomy, Bowel Surgery, Hysterectomy, Oophorectomy, Tubal 

Ligation


Respiratory:  No


Cardiac:  Yes


Atrial Fibrillation, Chronic Edema/Swelling, High Cholesterol, Hypertension


Neurological:  Yes


Neuropathy, TIA


GYN History:  Tubal Ligation


Genitourinary:  No


Gastrointestinal:  Yes


Colitis, Gastroesophageal Reflux, Pancreatitis


Musculoskeletal:  No


Endocrine:  Yes


Diabetes, Non-Insulin dep


HEENT:  No


Cancer:  No


Psychosocial:  No


Integumentary:  No


Blood Disorders:  No


Adverse Reaction/Blood Tranf:  No





Family Medical History


No Pertinent Family Hx





Physical Exam


Vital Signs





Vital Signs - First Documented








 20





 20:27 22:25


 


Temp 36.7 


 


Pulse 78 


 


Resp 18 


 


B/P (MAP) 181/66 (104) 


 


Pulse Ox  98


 


O2 Delivery Room Air 





Capillary Refill :


Height/Weight/BMI


Height: 5'4.00"


Weight: 114lbs. 2.0oz. 51.693850wo; 20.00 BMI


Method:Stated


General Appearance:  WD/WN, no apparent distress


Respiratory:  no respiratory distress, no accessory muscle use


Gastrointestinal:  normal bowel sounds, non tender, soft, other (abdomen 

somewhat distended, abdomen soft and minimally tender diffusely, bowel sounds 

are normal all)


Extremities:  normal range of motion, non-tender


Neurologic/Psychiatric:  alert, normal mood/affect, oriented x 3


Skin:  normal color, warm/dry





Progress/Results/Core Measures


Results/Orders


Lab Results





Laboratory Tests








Test


 20


20:48 Range/Units


 


 


White Blood Count


 9.2 


 4.3-11.0


10^3/uL


 


Red Blood Count


 4.06 L


 4.35-5.85


10^6/uL


 


Hemoglobin 11.4 L 11.5-16.0  G/DL


 


Hematocrit 35  35-52  %


 


Mean Corpuscular Volume 86  80-99  FL


 


Mean Corpuscular Hemoglobin 28  25-34  PG


 


Mean Corpuscular Hemoglobin


Concent 33 


 32-36  G/DL





 


Red Cell Distribution Width 16.6 H 10.0-14.5  %


 


Platelet Count


 276 


 130-400


10^3/uL


 


Mean Platelet Volume 11.7 H 7.4-10.4  FL


 


Neutrophils (%) (Auto) 46  42-75  %


 


Lymphocytes (%) (Auto) 33  12-44  %


 


Monocytes (%) (Auto) 15 H 0-12  %


 


Eosinophils (%) (Auto) 6  0-10  %


 


Basophils (%) (Auto) 1  0-10  %


 


Neutrophils # (Auto) 4.2  1.8-7.8  X 10^3


 


Lymphocytes # (Auto) 3.0  1.0-4.0  X 10^3


 


Monocytes # (Auto) 1.4 H 0.0-1.0  X 10^3


 


Eosinophils # (Auto)


 0.6 H


 0.0-0.3


10^3/uL


 


Basophils # (Auto)


 0.1 


 0.0-0.1


10^3/uL


 


Sodium Level 136  135-145  MMOL/L


 


Potassium Level 5.2 H 3.6-5.0  MMOL/L


 


Chloride Level 98    MMOL/L


 


Carbon Dioxide Level 24  21-32  MMOL/L


 


Anion Gap 14  5-14  MMOL/L


 


Blood Urea Nitrogen 23 H 7-18  MG/DL


 


Creatinine


 1.14 


 0.60-1.30


MG/DL


 


Estimat Glomerular Filtration


Rate 45 


  





 


BUN/Creatinine Ratio 20   


 


Glucose Level 161 H   MG/DL


 


Calcium Level 10.1  8.5-10.1  MG/DL


 


Corrected Calcium 9.7  8.5-10.1  MG/DL


 


Total Bilirubin 0.2  0.1-1.0  MG/DL


 


Aspartate Amino Transf


(AST/SGOT) 28 


 5-34  U/L





 


Alanine Aminotransferase


(ALT/SGPT) 21 


 0-55  U/L





 


Alkaline Phosphatase 82    U/L


 


Total Protein 7.9  6.4-8.2  GM/DL


 


Albumin 4.5  3.2-4.5  GM/DL


 


Lipase 51  8-78  U/L








My Orders





Orders - DAVID CALLEJAS


Cbc With Automated Diff (20 20:24)


Comprehensive Metabolic Panel (20 20:24)


Ed Iv/Invasive Line Start (20 20:24)


Lipase (20 20:24)


Hyoscyamine Sl Tablet (Levsin Sl Tablet) (20 20:30)


Antacid  Suspension (Mylanta  Suspension (20 20:30)


Lidocaine 2% Viscous 15 Ml (Xylocaine Vi (20 20:30)


Ct Abdomen/Pelvis Wo (20 20:37)


Lactated Ringers (Lr 1000 Ml Iv Solution (20 21:30)





Medications Given in ED





Vital Signs/I&O











 20





 20:27 22:25


 


Temp 36.7 36.7


 


Pulse 78 74


 


Resp 18 18


 


B/P (MAP) 181/66 (104) 157/65 (104)


 


Pulse Ox  98


 


O2 Delivery Room Air Room Air











Diagnostic Imaging





   Diagonstic Imaging:  CT


Comments


NAME:   TERESSA SINGH


MED REC#:   [id number]


ACCOUNT#:   [id number]


PT STATUS:   REG ER


:   1934


PHYSICIAN:   DAVID CALLEJAS


ADMIT DATE:   20/ER


                                   ***Draft***


Date of Exam:20





CT ABDOMEN/PELVIS WO








PROCEDURE: CT abdomen and pelvis without contrast.





TECHNIQUE: Multiple contiguous axial images were obtained through


the abdomen and pelvis without the use of intravenous contrast.


Auto Exposure Controls were utilized during the CT exam to meet


ALARA standards for radiation dose reduction. 





INDICATION:  Abdominal pain. Diarrhea.





COMPARISON: 10/28/2019





FINDINGS: Included portions of the lung bases are clear.





CT ABDOMEN: Large amount of air and stool is identified scattered


throughout the colon. Patient is status post previous partial


right hemicolectomy. Small bowel loops are nondistended.





The kidneys, adrenal glands, spleen, pancreas, and liver have an


unremarkable noncontrast CT appearance. There is no loculated


fluid collection, free fluid, nor free air within the abdomen. No


abnormal mesenteric or retroperitoneal adenopathy is seen. There


is moderate diffuse calcified aortic and arterial


atherosclerosis. Osseous structures show no acute abnormalities.





CT PELVIS: Urinary bladder is unopacified. No calculi are seen


within the urinary bladder. There is no loculated fluid


collection, free fluid, nor free air within the pelvis. No


abnormal lymph nodes are identified. Osseous structures show no


acute abnormalities.





IMPRESSION: 


1. Large amount of air and stool is noted scattered throughout


the colon. Please correlate for constipation.


2. Otherwise, no acute abnormalities are seen within the abdomen


or pelvis.





  Dictated on workstation # [id number]








Dict:   20


Trans:   20


[facility] [id number]





Interpreted by:     ODELL LAND MD


Electronically signed by:





Departure


Impression





   Primary Impression:  


   Abdominal cramping


Disposition:   HOME, SELF-CARE


Condition:  Stable





Departure-Patient Inst.


Decision time for Depature:  21:07


Referrals:  


NARCISO BERKOWITZ DO (PCP/Family)


Primary Care Physician


Patient Instructions:  NO INSTRUCTIONS GIVEN





Add. Discharge Instructions:  


1. Medication as directed


2. Return to ER for any concerns


3. Follow-up with your doctor next week


4.





All discharge instructions reviewed with patient and/or family. Voiced 

understanding.


Scripts


Dicyclomine HCl (Dicyclomine HCl) 10 Mg Capsule


10 MG PO TID, #15 CAP


   Prov: DAVID CALLEJAS APREMILY         20











DAVID CALLEJAS APREMILY             2020 20:34

## 2020-06-12 NOTE — DIAGNOSTIC IMAGING REPORT
PROCEDURE: CT abdomen and pelvis without contrast.



TECHNIQUE: Multiple contiguous axial images were obtained through

the abdomen and pelvis without the use of intravenous contrast.

Auto Exposure Controls were utilized during the CT exam to meet

ALARA standards for radiation dose reduction. 



INDICATION:  Abdominal pain. Diarrhea.



COMPARISON: 10/28/2019



FINDINGS: Included portions of the lung bases are clear.



CT ABDOMEN: Large amount of air and stool is identified scattered

throughout the colon. Patient is status post previous partial

right hemicolectomy. Small bowel loops are nondistended.



The kidneys, adrenal glands, spleen, pancreas, and liver have an

unremarkable noncontrast CT appearance. There is no loculated

fluid collection, free fluid, nor free air within the abdomen. No

abnormal mesenteric or retroperitoneal adenopathy is seen. There

is moderate diffuse calcified aortic and arterial

atherosclerosis. Osseous structures show no acute abnormalities.



CT PELVIS: Urinary bladder is unopacified. No calculi are seen

within the urinary bladder. There is no loculated fluid

collection, free fluid, nor free air within the pelvis. No

abnormal lymph nodes are identified. Osseous structures show no

acute abnormalities.



IMPRESSION: 

1. Large amount of air and stool is noted scattered throughout

the colon. Please correlate for constipation.

2. Otherwise, no acute abnormalities are seen within the abdomen

or pelvis.



Dictated by: 



  Dictated on workstation # [id number]

## 2020-12-28 ENCOUNTER — HOSPITAL ENCOUNTER (OUTPATIENT)
Dept: HOSPITAL 75 - CARD | Age: 85
End: 2020-12-28
Attending: INTERNAL MEDICINE
Payer: MEDICARE

## 2020-12-28 VITALS — WEIGHT: 114.64 LBS | BODY MASS INDEX: 20.31 KG/M2 | HEIGHT: 62.99 IN

## 2020-12-28 VITALS — SYSTOLIC BLOOD PRESSURE: 141 MMHG | DIASTOLIC BLOOD PRESSURE: 52 MMHG

## 2020-12-28 DIAGNOSIS — I25.10: Primary | ICD-10-CM

## 2020-12-28 PROCEDURE — 78452 HT MUSCLE IMAGE SPECT MULT: CPT

## 2020-12-28 PROCEDURE — 93017 CV STRESS TEST TRACING ONLY: CPT

## 2020-12-28 NOTE — CARDIOLOGY STRESS TEST REPORT
Stress Test Report


Date of Procedure/Referring:


Date of Procedure:  Dec 28, 2020


PCP


Jin Hutton MD


Admitting Physician


Fernando Deshpande DO





Baseline Blood Pressure:


Blood Pressure Systolic:  141


Blood Pressure Diastolic:  52


Baseline Vitals





Vital Signs








  Date Time  Temp Pulse Resp B/P (MAP) Pulse Ox O2 Delivery O2 Flow Rate FiO2


 


12/28/20 09:45  67 16 141/52 (81) 97 Room Air  











Summary


After explaining the procedure to the patient, she  signed a consent and then 

brought to the stress nuclear laboratory.


Patient received 0.4 mg Lexiscan for stress test, ECG, heart rate and blood 

pressure were monitored continuously.  Resting and stress dose of radio tracer 

were injected, imaging was acquired and reviewed in short axis, horizontal long 

axis and vertical long axis views.


TID:  1.11


SSS:  3


SDS:  1


EF:  76


Patient tolerated test well


Breast attenuation with mild decrease uptake in the apex with mild 

reversibility, no significant ischemia or infarction on SPECT images


Normal LV size, EF 76%











JIN HUTTON MD              Dec 28, 2020 14:32

## 2021-03-02 ENCOUNTER — HOSPITAL ENCOUNTER (OUTPATIENT)
Dept: HOSPITAL 75 - RAD | Age: 86
End: 2021-03-02
Attending: FAMILY MEDICINE
Payer: MEDICARE

## 2021-03-02 DIAGNOSIS — M47.816: ICD-10-CM

## 2021-03-02 DIAGNOSIS — M43.16: ICD-10-CM

## 2021-03-02 DIAGNOSIS — M79.605: Primary | ICD-10-CM

## 2021-03-02 PROCEDURE — 72170 X-RAY EXAM OF PELVIS: CPT

## 2021-03-02 PROCEDURE — 72100 X-RAY EXAM L-S SPINE 2/3 VWS: CPT

## 2021-03-02 NOTE — DIAGNOSTIC IMAGING REPORT
INDICATION: Left leg pain after a fall.



EXAM: AP view pelvis



The pelvic ring is intact. Hip joints appear normal. There is no

fracture or dislocation.



IMPRESSION: Unremarkable pelvis.



Dictated by: 



  Dictated on workstation # [id number]

## 2021-03-02 NOTE — DIAGNOSTIC IMAGING REPORT
EXAM: LUMBAR SPINE - 2-3 VIEWS



INDICATION: Low back pain. Fall 3 weeks ago.



COMPARISON: None.



FINDINGS: There 5 lumbar-type vertebral bodies. Grade 1

anterolisthesis of L4 and L5. Vertebral body heights preserved.

No fractures identified. Mild to moderate degenerative endplate

changes are greatest at L4-L5. Visualized pelvis is intact.

Moderate atherosclerotic calcifications.



IMPRESSION: Mild to moderate spondylotic changes are greatest at

L4-L5 where there is grade 1 anterolisthesis. No acute

radiographic findings.



Dictated by: 



  Dictated on workstation # [id number]

## 2021-03-15 ENCOUNTER — HOSPITAL ENCOUNTER (OUTPATIENT)
Dept: HOSPITAL 75 - RAD | Age: 86
End: 2021-03-15
Attending: FAMILY MEDICINE
Payer: MEDICARE

## 2021-03-15 DIAGNOSIS — M47.816: ICD-10-CM

## 2021-03-15 DIAGNOSIS — M16.12: Primary | ICD-10-CM

## 2021-03-15 PROCEDURE — 73502 X-RAY EXAM HIP UNI 2-3 VIEWS: CPT

## 2021-03-15 PROCEDURE — 72100 X-RAY EXAM L-S SPINE 2/3 VWS: CPT

## 2021-03-15 NOTE — DIAGNOSTIC IMAGING REPORT
INDICATION: 

Back pain after a fall.



TECHNIQUE: 

Three views were obtained.



FINDINGS: 

There is some degenerative anterolisthesis of L4 on L5. The

vertebral body heights are well-maintained. There is no acute

fracture. There is lower lumbar hypertrophic degenerative facet

disease.



IMPRESSION: 

Lower lumbar spondylosis; however, no acute fracture or traumatic

subluxation.



Dictated by: 



  Dictated on workstation # [id number]

## 2021-03-15 NOTE — DIAGNOSTIC IMAGING REPORT
INDICATION: Hip pain.



Two views were obtained.



FINDINGS: The alignment is normal. There are mild degenerative

changes. There is no fracture or dislocation. Soft tissues are

unremarkable.



IMPRESSION: Mild degenerative changes, otherwise unremarkable.



Dictated by: 



  Dictated on workstation # [id number]

## 2021-09-02 ENCOUNTER — HOSPITAL ENCOUNTER (OUTPATIENT)
Dept: HOSPITAL 75 - RAD | Age: 86
End: 2021-09-02
Attending: FAMILY MEDICINE
Payer: MEDICARE

## 2021-09-02 DIAGNOSIS — R14.0: Primary | ICD-10-CM

## 2021-09-02 DIAGNOSIS — Z90.49: ICD-10-CM

## 2021-09-02 DIAGNOSIS — Z90.89: ICD-10-CM

## 2021-09-02 DIAGNOSIS — Z90.710: ICD-10-CM

## 2021-09-02 DIAGNOSIS — R10.9: ICD-10-CM

## 2021-09-02 LAB
ALBUMIN SERPL-MCNC: 4.3 GM/DL (ref 3.2–4.5)
ALP SERPL-CCNC: 79 U/L (ref 40–136)
ALT SERPL-CCNC: 25 U/L (ref 0–55)
BASOPHILS # BLD AUTO: 0.1 10^3/UL (ref 0–0.1)
BASOPHILS NFR BLD AUTO: 1 % (ref 0–10)
BILIRUB SERPL-MCNC: 0.2 MG/DL (ref 0.1–1)
BUN/CREAT SERPL: 16
CALCIUM SERPL-MCNC: 9.4 MG/DL (ref 8.5–10.1)
CHLORIDE SERPL-SCNC: 104 MMOL/L (ref 98–107)
CO2 SERPL-SCNC: 17 MMOL/L (ref 21–32)
CREAT SERPL-MCNC: 0.82 MG/DL (ref 0.6–1.3)
EOSINOPHIL # BLD AUTO: 0.4 10^3/UL (ref 0–0.3)
EOSINOPHIL NFR BLD AUTO: 6 % (ref 0–10)
GFR SERPLBLD BASED ON 1.73 SQ M-ARVRAT: 66 ML/MIN
GLUCOSE SERPL-MCNC: 118 MG/DL (ref 70–105)
HCT VFR BLD CALC: 39 % (ref 35–52)
HGB BLD-MCNC: 11.9 G/DL (ref 11.5–16)
LIPASE SERPL-CCNC: 34 U/L (ref 8–78)
LYMPHOCYTES # BLD AUTO: 1.8 10^3/UL (ref 1–4)
LYMPHOCYTES NFR BLD AUTO: 23 % (ref 12–44)
MANUAL DIFFERENTIAL PERFORMED BLD QL: NO
MCH RBC QN AUTO: 28 PG (ref 25–34)
MCHC RBC AUTO-ENTMCNC: 31 G/DL (ref 32–36)
MCV RBC AUTO: 90 FL (ref 80–99)
MONOCYTES # BLD AUTO: 1.2 10^3/UL (ref 0–1)
MONOCYTES NFR BLD AUTO: 16 % (ref 0–12)
NEUTROPHILS # BLD AUTO: 4 10^3/UL (ref 1.8–7.8)
NEUTROPHILS NFR BLD AUTO: 54 % (ref 42–75)
PLATELET # BLD: 182 10^3/UL (ref 130–400)
PMV BLD AUTO: 12 FL (ref 9–12.2)
POTASSIUM SERPL-SCNC: 4.4 MMOL/L (ref 3.6–5)
PROT SERPL-MCNC: 7.1 GM/DL (ref 6.4–8.2)
SODIUM SERPL-SCNC: 133 MMOL/L (ref 135–145)
WBC # BLD AUTO: 7.5 10^3/UL (ref 4.3–11)

## 2021-09-02 PROCEDURE — 85025 COMPLETE CBC W/AUTO DIFF WBC: CPT

## 2021-09-02 PROCEDURE — 74177 CT ABD & PELVIS W/CONTRAST: CPT

## 2021-09-02 PROCEDURE — 80053 COMPREHEN METABOLIC PANEL: CPT

## 2021-09-02 PROCEDURE — 36415 COLL VENOUS BLD VENIPUNCTURE: CPT

## 2021-09-02 PROCEDURE — 83690 ASSAY OF LIPASE: CPT

## 2021-09-02 NOTE — DIAGNOSTIC IMAGING REPORT
INDICATION: Abdominal distention and pain x3 months, prior

history of partial colectomy and hysterectomy and appendectomy.



TECHNIQUE: Multiple contiguous axial images were obtained through

the abdomen and pelvis after administration of intravenous

contrast. Auto Exposure Controls were utilized during the CT exam

to meet ALARA standards for radiation dose reduction. All CT

scans use one or more of the following dose optimizing

techniques: automated exposure control, MA and/or KvP adjustment

based on patient size and exam type or iterative reconstruction.



COMPARISON: Comparison made to the previous noncontrast CT of

06/12/2020.



FINDINGS: The visualized portions of the lung bases are clear.

There were no pleural fluid collections. There is no free

intraperitoneal air.



The liver shows no focal lesion. Gallbladder appears

unremarkable. The spleen, adrenals, and pancreas are normal.

Kidneys bilaterally appear normal. There is no retroperitoneal

mass or adenopathy. There is no ascites or abnormal fluid

collection.



Visualized bowel loops demonstrate large amount of stool in the

colon. There is anastomosis in the right colon. There is no overt

bowel obstruction. Small bowel loops are not appreciably dilated.



IMPRESSION: Evidence of previous partial colectomy as well as

hysterectomy and appendectomy. There is no abdominal mass or

abnormal fluid collection or abscess. There is a large amount of

stool in the colon. The appearance has not changed from

06/12/2020. There is no new abnormality.



Dictated by: 



  Dictated on workstation # [id number]